# Patient Record
Sex: FEMALE | Race: WHITE | Employment: OTHER | URBAN - METROPOLITAN AREA
[De-identification: names, ages, dates, MRNs, and addresses within clinical notes are randomized per-mention and may not be internally consistent; named-entity substitution may affect disease eponyms.]

---

## 2017-02-06 RX ORDER — AMLODIPINE BESYLATE AND BENAZEPRIL HYDROCHLORIDE 10; 20 MG/1; MG/1
CAPSULE ORAL
Qty: 90 CAPSULE | Refills: 0 | OUTPATIENT
Start: 2017-02-06

## 2017-02-09 ENCOUNTER — OFFICE VISIT (OUTPATIENT)
Dept: PODIATRY CLINIC | Facility: CLINIC | Age: 72
End: 2017-02-09

## 2017-02-09 DIAGNOSIS — B35.1 ONYCHOMYCOSIS: Primary | ICD-10-CM

## 2017-02-09 PROCEDURE — G0463 HOSPITAL OUTPT CLINIC VISIT: HCPCS | Performed by: PODIATRIST

## 2017-02-09 PROCEDURE — 99212 OFFICE O/P EST SF 10 MIN: CPT | Performed by: PODIATRIST

## 2017-02-09 NOTE — PROGRESS NOTES
HPI:    Patient ID: Miguel Hi is a 73year old female. HPI  This 73-year-old female presents 2 months after starting oral Lamisil. Patient is convinced that there is progress noted.   There are no ill effects with taking the oral antifungal medicati ASSESSMENT/PLAN:   Onychomycosis  (primary encounter diagnosis)    No orders of the defined types were placed in this encounter.        Meds This Visit:  No prescriptions requested or ordered in this encounter    Imaging & Referrals:  None       ZQ#[id number]

## 2017-02-17 ENCOUNTER — OFFICE VISIT (OUTPATIENT)
Dept: FAMILY MEDICINE CLINIC | Facility: CLINIC | Age: 72
End: 2017-02-17

## 2017-02-17 VITALS
WEIGHT: 230 LBS | DIASTOLIC BLOOD PRESSURE: 70 MMHG | OXYGEN SATURATION: 98 % | SYSTOLIC BLOOD PRESSURE: 138 MMHG | BODY MASS INDEX: 34.86 KG/M2 | HEIGHT: 68 IN

## 2017-02-17 DIAGNOSIS — C50.911 STAGE 1 BREAST CANCER, ER-, RIGHT (HCC): Primary | ICD-10-CM

## 2017-02-17 DIAGNOSIS — Z12.31 VISIT FOR SCREENING MAMMOGRAM: ICD-10-CM

## 2017-02-17 DIAGNOSIS — Z13.6 SCREENING FOR CARDIOVASCULAR CONDITION: ICD-10-CM

## 2017-02-17 DIAGNOSIS — N95.1 MENOPAUSAL STATE: ICD-10-CM

## 2017-02-17 DIAGNOSIS — Z00.00 ENCOUNTER FOR ANNUAL HEALTH EXAMINATION: ICD-10-CM

## 2017-02-17 DIAGNOSIS — Z17.1 STAGE 1 BREAST CANCER, ER-, RIGHT (HCC): Primary | ICD-10-CM

## 2017-02-17 DIAGNOSIS — Z78.0 POSTMENOPAUSAL: ICD-10-CM

## 2017-02-17 DIAGNOSIS — J45.20 MILD INTERMITTENT ASTHMA WITHOUT COMPLICATION: ICD-10-CM

## 2017-02-17 DIAGNOSIS — I10 ESSENTIAL HYPERTENSION: ICD-10-CM

## 2017-02-17 PROBLEM — J45.909 ASTHMA: Status: ACTIVE | Noted: 2017-02-17

## 2017-02-17 PROBLEM — J45.909 ASTHMA (HCC): Status: ACTIVE | Noted: 2017-02-17

## 2017-02-17 PROCEDURE — G0438 PPPS, INITIAL VISIT: HCPCS | Performed by: FAMILY MEDICINE

## 2017-02-17 RX ORDER — AMLODIPINE BESYLATE AND BENAZEPRIL HYDROCHLORIDE 10; 20 MG/1; MG/1
1 CAPSULE ORAL DAILY
Qty: 90 CAPSULE | Refills: 3 | Status: SHIPPED | OUTPATIENT
Start: 2017-02-17 | End: 2018-02-12

## 2017-02-17 NOTE — PATIENT INSTRUCTIONS
Xavier Silverman's SCREENING SCHEDULE   Tests on this list are recommended by your physician but may not be covered, or covered at this frequency, by your insurer. Please check with your insurance carrier before scheduling to verify coverage.    PREVENTATIVE Screen  Covered every 5 years No results found for this or any previous visit. No flowsheet data found. Fecal Occult Blood   Covered Annually No results found for: FOB, OCCULTSTOOL No flowsheet data found.      Barium Enema-   uncomfortable but covered Moderate/High Risk       No orders found for this or any previous visit.  Medium/high risk factors:   End-stage renal disease   Hemophiliacs who received Factor VIII or IX concentrates   Clients of institutions for the mentally retarded   Persons who live i Blood Sugar (FSB)   Patient must be diagnosed with one of the following:   • Hypertension   • Dyslipidemia   • Obesity (BMI ³30 kg/m2)   • Previous elevated impaired FBS or GTT   … or any two of the following:   • Overweight (BMI ³25 but <30)   • Family hi found. OK to schedule if you are in this risk group, make sure you have a referral   Bone Density Screening      Bone density screening   Covered every 2 yrs after age 72    Covered yearly for Long term Glucocorticoid medication (Steroids) Requires diagnos visit. This may be covered with your prescription benefits, but Medicare does not cover unless Medically needed    Zoster (Not covered by Medicare Part B) No orders found for this or any previous visit.  This may be covered with your pharmacy  prescription

## 2017-02-17 NOTE — PROGRESS NOTES
HPI:   Nola Pat is a 73year old female who presents for a Medicare Subsequent Annual Wellness visit (Pt already had Initial Annual Wellness).     Her last annual assessment has been over 1 year: Annual Physical due on 03/11/1947         Patient Care She  has a past medical history of Asthma; PUD (peptic ulcer disease) (2012); S/P colonoscopy (2009); Port-a-cath in place (2/24/2014); [facility]); and Achilles rupture, right (2016).     She  has past surgical history that includes f Left Eye Visual Acuity: Uncorrected Left Eye Chart Acuity: 20/25   Both Eyes Visual Acuity: Uncorrected Both Eyes Chart Acuity: 20/25   Able To Tolerate Visual Acuity: Yes      General Appearance:  Alert, cooperative, no distress, appears stated age   He Diet assessment: good     Advanced Directive:  Living Will on file in [place]? Carmela Madison does not have a Living Will on file in [place].  Not Discussed      Healthcare Power of  on file in Epic:    Carmela Madison does not have a Power of [employer] activities?: 0-No     Have you had any memory issues?: 0-No    Fall/Risk Scorin    Scoring Interpretation: 0 - 3 No Risk     Depression Screening (PHQ-2/PHQ-9): Over the LAST 2 WEEKS   Little interest or pleasure in doing things (over the last two week flowsheet data found. Pap and Pelvic      Pap: Every 3 yrs age 21-68 or Pap+HPV every 5 yrs age 33-67, age 72 and older at high risk There are no preventive care reminders to display for this patient.  Update Health Maintenance if applicable    Chlamydia

## 2017-02-18 ENCOUNTER — LAB ENCOUNTER (OUTPATIENT)
Dept: LAB | Facility: HOSPITAL | Age: 72
End: 2017-02-18
Attending: FAMILY MEDICINE
Payer: MEDICARE

## 2017-02-18 DIAGNOSIS — I10 ESSENTIAL HYPERTENSION: ICD-10-CM

## 2017-02-18 DIAGNOSIS — Z17.1 STAGE 1 BREAST CANCER, ER-, RIGHT (HCC): ICD-10-CM

## 2017-02-18 DIAGNOSIS — Z13.6 SCREENING FOR CARDIOVASCULAR CONDITION: ICD-10-CM

## 2017-02-18 DIAGNOSIS — C50.911 STAGE 1 BREAST CANCER, ER-, RIGHT (HCC): ICD-10-CM

## 2017-02-18 LAB
ALBUMIN SERPL BCP-MCNC: 4.2 G/DL (ref 3.5–4.8)
ALBUMIN/GLOB SERPL: 1.4 {RATIO} (ref 1–2)
ALP SERPL-CCNC: 75 U/L (ref 32–100)
ALT SERPL-CCNC: 18 U/L (ref 14–54)
ANION GAP SERPL CALC-SCNC: 9 MMOL/L (ref 0–18)
AST SERPL-CCNC: 21 U/L (ref 15–41)
BASOPHILS # BLD: 0.1 K/UL (ref 0–0.2)
BASOPHILS NFR BLD: 1 %
BILIRUB SERPL-MCNC: 1 MG/DL (ref 0.3–1.2)
BUN SERPL-MCNC: 14 MG/DL (ref 8–20)
BUN/CREAT SERPL: 18.7 (ref 10–20)
CALCIUM SERPL-MCNC: 9.6 MG/DL (ref 8.5–10.5)
CHLORIDE SERPL-SCNC: 101 MMOL/L (ref 95–110)
CHOLEST SERPL-MCNC: 228 MG/DL (ref 110–200)
CO2 SERPL-SCNC: 28 MMOL/L (ref 22–32)
CREAT SERPL-MCNC: 0.75 MG/DL (ref 0.5–1.5)
EOSINOPHIL # BLD: 0.1 K/UL (ref 0–0.7)
EOSINOPHIL NFR BLD: 2 %
ERYTHROCYTE [DISTWIDTH] IN BLOOD BY AUTOMATED COUNT: 12.5 % (ref 11–15)
GLOBULIN PLAS-MCNC: 2.9 G/DL (ref 2.5–3.7)
GLUCOSE SERPL-MCNC: 108 MG/DL (ref 70–99)
HCT VFR BLD AUTO: 39.3 % (ref 35–48)
HDLC SERPL-MCNC: 67 MG/DL
HGB BLD-MCNC: 13.2 G/DL (ref 12–16)
LDLC SERPL CALC-MCNC: 142 MG/DL (ref 0–99)
LYMPHOCYTES # BLD: 1.9 K/UL (ref 1–4)
LYMPHOCYTES NFR BLD: 34 %
MCH RBC QN AUTO: 31.5 PG (ref 27–32)
MCHC RBC AUTO-ENTMCNC: 33.6 G/DL (ref 32–37)
MCV RBC AUTO: 93.6 FL (ref 80–100)
MONOCYTES # BLD: 0.7 K/UL (ref 0–1)
MONOCYTES NFR BLD: 12 %
NEUTROPHILS # BLD AUTO: 3 K/UL (ref 1.8–7.7)
NEUTROPHILS NFR BLD: 52 %
NONHDLC SERPL-MCNC: 161 MG/DL
OSMOLALITY UR CALC.SUM OF ELEC: 287 MOSM/KG (ref 275–295)
PLATELET # BLD AUTO: 289 K/UL (ref 140–400)
PMV BLD AUTO: 7.4 FL (ref 7.4–10.3)
POTASSIUM SERPL-SCNC: 4.1 MMOL/L (ref 3.3–5.1)
PROT SERPL-MCNC: 7.1 G/DL (ref 5.9–8.4)
RBC # BLD AUTO: 4.2 M/UL (ref 3.7–5.4)
SODIUM SERPL-SCNC: 138 MMOL/L (ref 136–144)
TRIGL SERPL-MCNC: 93 MG/DL (ref 1–149)
WBC # BLD AUTO: 5.7 K/UL (ref 4–11)

## 2017-02-18 PROCEDURE — 85025 COMPLETE CBC W/AUTO DIFF WBC: CPT

## 2017-02-18 PROCEDURE — 80053 COMPREHEN METABOLIC PANEL: CPT

## 2017-02-18 PROCEDURE — 36415 COLL VENOUS BLD VENIPUNCTURE: CPT

## 2017-02-18 PROCEDURE — 80061 LIPID PANEL: CPT

## 2017-02-24 ENCOUNTER — HOSPITAL ENCOUNTER (OUTPATIENT)
Dept: BONE DENSITY | Facility: HOSPITAL | Age: 72
Discharge: HOME | End: 2017-02-24
Attending: FAMILY MEDICINE
Payer: MEDICARE

## 2017-02-24 DIAGNOSIS — N95.1 MENOPAUSAL STATE: ICD-10-CM

## 2017-02-24 DIAGNOSIS — Z78.0 POSTMENOPAUSAL: ICD-10-CM

## 2017-02-24 PROCEDURE — 77080 DXA BONE DENSITY AXIAL: CPT

## 2017-04-13 ENCOUNTER — OFFICE VISIT (OUTPATIENT)
Dept: HEMATOLOGY/ONCOLOGY | Facility: HOSPITAL | Age: 72
End: 2017-04-13
Attending: INTERNAL MEDICINE
Payer: MEDICARE

## 2017-04-13 VITALS
HEART RATE: 80 BPM | DIASTOLIC BLOOD PRESSURE: 74 MMHG | RESPIRATION RATE: 18 BRPM | TEMPERATURE: 98 F | SYSTOLIC BLOOD PRESSURE: 129 MMHG | HEIGHT: 68 IN | WEIGHT: 230 LBS | BODY MASS INDEX: 34.86 KG/M2

## 2017-04-13 DIAGNOSIS — Z17.1 STAGE 1 BREAST CANCER, ER-, RIGHT (HCC): Primary | ICD-10-CM

## 2017-04-13 DIAGNOSIS — C50.911 STAGE 1 BREAST CANCER, ER-, RIGHT (HCC): Primary | ICD-10-CM

## 2017-04-13 DIAGNOSIS — Z80.9 FAMILY HISTORY OF CANCER: ICD-10-CM

## 2017-04-13 PROCEDURE — 99213 OFFICE O/P EST LOW 20 MIN: CPT | Performed by: INTERNAL MEDICINE

## 2017-04-13 PROCEDURE — G0463 HOSPITAL OUTPT CLINIC VISIT: HCPCS | Performed by: INTERNAL MEDICINE

## 2017-04-13 NOTE — PROGRESS NOTES
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 She returns today for follow-up. She is not aware of bone pain, chest wall recurrence, or shortness of breath.        Patient sustained a r Allergic/Immunologic: Negative for environmental allergies. Neurological: Negative for light-headedness. Tingling in arm if she sleeps on it. Hematological: Negative for adenopathy. Does not bruise/bleed easily.    Psychiatric/Behavioral: Positiv Past Surgical History    FINE NEEDLE ASPIRATION; W/ IMAGING GUIDANCE Right 11/19/2013    Comment ULTRASOUND GUIDED FNA AND CORE BIOPSY RIGHT BREAST:    MASTECTOMY LEFT Left 12/17/2013    Comment RIGHT BREAST MASTECTOMY WITH SENTINEL LYMPH NODE BIOPSY AND L Abdominal: Soft. Bowel sounds are normal. She exhibits no distension and no mass. There is no tenderness. Musculoskeletal: Normal range of motion. She exhibits no edema or tenderness. Lymphadenopathy:     She has no cervical adenopathy.    Neurological:             BMD:    0.936 gm/sq. cm.           T MXQJT:         6.2        G SCORE:         1.5      PA LUMBAR SPINE (L1 - L4)              BMD:    0.999 gm/sq.  cm.           T LUPGB:         -8.7       M SCORE:         1.7                      A scores ar

## 2017-05-17 ENCOUNTER — OFFICE VISIT (OUTPATIENT)
Dept: FAMILY MEDICINE CLINIC | Facility: CLINIC | Age: 72
End: 2017-05-17

## 2017-05-17 VITALS
WEIGHT: 235 LBS | SYSTOLIC BLOOD PRESSURE: 126 MMHG | OXYGEN SATURATION: 98 % | DIASTOLIC BLOOD PRESSURE: 70 MMHG | BODY MASS INDEX: 36.88 KG/M2 | HEART RATE: 97 BPM | HEIGHT: 67 IN

## 2017-05-17 DIAGNOSIS — S83.521A SPRAIN OF POSTERIOR CRUCIATE LIGAMENT OF RIGHT KNEE, INITIAL ENCOUNTER: Primary | ICD-10-CM

## 2017-05-17 PROCEDURE — 99212 OFFICE O/P EST SF 10 MIN: CPT | Performed by: FAMILY MEDICINE

## 2017-05-17 RX ORDER — METHYLPREDNISOLONE 4 MG/1
TABLET ORAL
Qty: 1 KIT | Refills: 1 | Status: SHIPPED | OUTPATIENT
Start: 2017-05-17 | End: 2017-10-12 | Stop reason: ALTCHOICE

## 2017-05-17 NOTE — PROGRESS NOTES
[facility] Office Note  Chief Complaint:   Patient presents with:  Knee Pain: behind her knee R      HPI:   This is a 81year old female coming in for      Results for orders placed or performed in visit on 02/18/17  -L RIGHT BREAST MASTECTOMY WITH SENTINEL LYMPH NODE BIOPSY AND LEFT BREAST MASTECTOMY   • Achilles rupture, right 2016         Past Surgical History    FINE NEEDLE ASPIRATION; W/ IMAGING GUIDANCE Right 11/19/2013    Comment ULTRASOUND GUIDED FNA AND CORE BIO Oral Cap Take 1 capsule by mouth daily. Disp:  Rfl:    Budesonide-Formoterol Fumarate (SYMBICORT) 160-4.5 MCG/ACT Inhalation Aerosol Inhale 2 puffs into the lungs daily. Disp:  Rfl:    Omega-3 Fatty Acids (KP FISH OIL) 1200 MG Oral Cap Take  by mouth.  Disp keith  medrol    Meds & Refills for this Visit:  Signed Prescriptions Disp Refills    methylPREDNISolone (MEDROL) 4 MG Oral Tablet Therapy Pack 1 kit 1      Sig: As directed.            Health Maintenance:  Hedy bailon on 03/11/1987  FIT Colorectal

## 2017-05-23 ENCOUNTER — TELEPHONE (OUTPATIENT)
Dept: PODIATRY CLINIC | Facility: CLINIC | Age: 72
End: 2017-05-23

## 2017-05-23 NOTE — TELEPHONE ENCOUNTER
S/w pt and she states her toenail fungus is still a problem and wants to know if SCR will rx the lamisil again.  I advised her that SCR would want her to f/u and offered her an appt, but she declined and states nothing has changed and wants to know if SCR w

## 2017-05-23 NOTE — TELEPHONE ENCOUNTER
If lamisil did not clear the problem once, i would not use it again. If the oral med did not work there are no other options of care.  scr

## 2017-05-23 NOTE — TELEPHONE ENCOUNTER
S/w pt and explained that no further tx would help the toenail fungus since the lamisil did not work. Pt verbalized understanding.

## 2017-05-23 NOTE — TELEPHONE ENCOUNTER
Pt requesting appt on week of 6/5, states she was supposed to see  SCR after finishing her rx. Pls advise thank you.

## 2017-08-18 ENCOUNTER — TELEPHONE (OUTPATIENT)
Dept: FAMILY MEDICINE CLINIC | Facility: CLINIC | Age: 72
End: 2017-08-18

## 2017-08-18 DIAGNOSIS — M25.569 ACUTE KNEE PAIN, UNSPECIFIED LATERALITY: Primary | ICD-10-CM

## 2017-09-05 RX ORDER — BUDESONIDE AND FORMOTEROL FUMARATE DIHYDRATE 160; 4.5 UG/1; UG/1
AEROSOL RESPIRATORY (INHALATION)
Qty: 1 INHALER | Refills: 2 | Status: SHIPPED | OUTPATIENT
Start: 2017-09-05 | End: 2018-02-12

## 2017-10-12 ENCOUNTER — OFFICE VISIT (OUTPATIENT)
Dept: HEMATOLOGY/ONCOLOGY | Facility: HOSPITAL | Age: 72
End: 2017-10-12
Attending: INTERNAL MEDICINE
Payer: MEDICARE

## 2017-10-12 VITALS
RESPIRATION RATE: 16 BRPM | HEIGHT: 68 IN | TEMPERATURE: 99 F | BODY MASS INDEX: 34.86 KG/M2 | SYSTOLIC BLOOD PRESSURE: 126 MMHG | DIASTOLIC BLOOD PRESSURE: 67 MMHG | WEIGHT: 230 LBS | HEART RATE: 76 BPM

## 2017-10-12 DIAGNOSIS — Z80.9 FAMILY HISTORY OF CANCER: ICD-10-CM

## 2017-10-12 DIAGNOSIS — C50.911 MALIGNANT NEOPLASM OF RIGHT BREAST, STAGE 1, ESTROGEN RECEPTOR NEGATIVE (HCC): Primary | ICD-10-CM

## 2017-10-12 DIAGNOSIS — Z17.1 MALIGNANT NEOPLASM OF RIGHT BREAST, STAGE 1, ESTROGEN RECEPTOR NEGATIVE (HCC): Primary | ICD-10-CM

## 2017-10-12 PROCEDURE — G0463 HOSPITAL OUTPT CLINIC VISIT: HCPCS | Performed by: INTERNAL MEDICINE

## 2017-10-12 PROCEDURE — 99213 OFFICE O/P EST LOW 20 MIN: CPT | Performed by: INTERNAL MEDICINE

## 2017-10-12 NOTE — PROGRESS NOTES
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 She returns today for follow-up. She is not aware of bone pain, chest wall recurrence, or shortness of breath.     She is tearful with her Allergic/Immunologic: Negative for environmental allergies. Neurological: Negative for light-headedness. Tingling in arm if she sleeps on it. Hematological: Negative for adenopathy. Does not bruise/bleed easily.    Psychiatric/Behavioral: Positiv Past Surgical History:  11/19/2013: FINE NEEDLE ASPIRATION; W/ IMAGING GUIDANCE Right      Comment: ULTRASOUND GUIDED FNA AND CORE BIOPSY RIGHT                BREAST:  12/17/2013: MASTECTOMY LEFT Left      Comment: RIGHT BREAST MASTECTOMY WITH SENTINEL LYM Cardiovascular: Normal rate, regular rhythm and normal heart sounds. Pulmonary/Chest: Effort normal and breath sounds normal. No respiratory distress. She has no wheezes. She exhibits no tenderness. S/p port a cath right chest    Abdominal: Soft. LEFT FEMORAL NECK              BMD:    0.808 gm/sq. cm.           T NCAWH:         -2.4       V SCORE:         1.4      LEFT TOTAL HIP              BMD:    0.936 gm/sq.  cm.           T CBJH.1        V SCORE:         1.5      PA LUMBAR SPINE (L1 95 - 110 mmol/L 101   Carbon Dioxide, Total      22 - 32 mmol/L 28   BUN      8 - 20 mg/dL 14   CREATININE      0.50 - 1.50 mg/dL 0.75   CALCIUM      8.5 - 10.5 mg/dL 9.6   ALT (SGPT)      14 - 54 U/L 18   AST (SGOT)      15 - 41 U/L 21   ALKALINE PHOS

## 2018-02-12 ENCOUNTER — OFFICE VISIT (OUTPATIENT)
Dept: FAMILY MEDICINE CLINIC | Facility: CLINIC | Age: 73
End: 2018-02-12

## 2018-02-12 VITALS
DIASTOLIC BLOOD PRESSURE: 82 MMHG | BODY MASS INDEX: 34.1 KG/M2 | HEIGHT: 68 IN | WEIGHT: 225 LBS | SYSTOLIC BLOOD PRESSURE: 120 MMHG

## 2018-02-12 DIAGNOSIS — E78.00 HIGH CHOLESTEROL: Primary | ICD-10-CM

## 2018-02-12 DIAGNOSIS — I10 ESSENTIAL HYPERTENSION: ICD-10-CM

## 2018-02-12 PROCEDURE — 99213 OFFICE O/P EST LOW 20 MIN: CPT | Performed by: FAMILY MEDICINE

## 2018-02-12 RX ORDER — ALBUTEROL SULFATE 90 UG/1
2 AEROSOL, METERED RESPIRATORY (INHALATION) EVERY 6 HOURS PRN
Qty: 1 INHALER | Refills: 5 | Status: SHIPPED | OUTPATIENT
Start: 2018-02-12 | End: 2020-05-13

## 2018-02-12 RX ORDER — BUDESONIDE AND FORMOTEROL FUMARATE DIHYDRATE 160; 4.5 UG/1; UG/1
AEROSOL RESPIRATORY (INHALATION)
Qty: 1 INHALER | Refills: 5 | Status: SHIPPED | OUTPATIENT
Start: 2018-02-12 | End: 2019-09-30

## 2018-02-12 RX ORDER — AMLODIPINE BESYLATE AND BENAZEPRIL HYDROCHLORIDE 10; 20 MG/1; MG/1
1 CAPSULE ORAL DAILY
Qty: 90 CAPSULE | Refills: 3 | Status: SHIPPED | OUTPATIENT
Start: 2018-02-12 | End: 2018-10-11

## 2018-02-12 RX ORDER — AMLODIPINE BESYLATE AND BENAZEPRIL HYDROCHLORIDE 10; 20 MG/1; MG/1
1 CAPSULE ORAL DAILY
Qty: 90 CAPSULE | Refills: 3 | Status: CANCELLED | OUTPATIENT
Start: 2018-02-12

## 2018-02-12 NOTE — PROGRESS NOTES
[facility] Office Note  Chief Complaint:   Patient presents with:  HTN  Lump: on top of head, no growth or pain  Rash: on inside of arms  Asthma  Neck Pain      HPI:   This is a 81year old female coming in for f\u    R cancer [facility]) 12/2/2013 12/17/2013:  RIGHT BREAST MASTECTOMY WITH SENTINEL LYMPH NODE BIOPSY AND LEFT BREAST MASTECTOMY   • Port-a-cath in place 2/24/2014   • PUD (peptic ulcer disease) 2012    /esophagitis / EGD with biopsy   • S/P colonoscopy 2009 0.3 MG/0.3ML Injection Solution Auto-injector Inject IM in event of  allergic reaction Disp: 1 each Rfl: 0   Acidophilus/Pectin (PROBIOTIC) Oral Cap Take 1 capsule by mouth daily.  Disp:  Rfl:    Omega-3 Fatty Acids (KP FISH OIL) 1200 MG Oral Cap Take  by m Visit:  Pending Prescriptions Disp Refills    Albuterol Sulfate HFA (PROAIR HFA) 108 (90 Base) MCG/ACT Inhalation Aero Soln 1 Inhaler 5     Sig: Inhale 2 puffs into the lungs every 6 (six) hours as needed for Wheezing.       Budesonide-Formoterol Fumarate (

## 2018-04-12 ENCOUNTER — OFFICE VISIT (OUTPATIENT)
Dept: HEMATOLOGY/ONCOLOGY | Facility: HOSPITAL | Age: 73
End: 2018-04-12
Attending: INTERNAL MEDICINE
Payer: MEDICARE

## 2018-04-12 VITALS
RESPIRATION RATE: 16 BRPM | HEIGHT: 68 IN | HEART RATE: 96 BPM | DIASTOLIC BLOOD PRESSURE: 81 MMHG | SYSTOLIC BLOOD PRESSURE: 138 MMHG | BODY MASS INDEX: 33.95 KG/M2 | WEIGHT: 224 LBS | TEMPERATURE: 98 F

## 2018-04-12 DIAGNOSIS — Z80.9 FAMILY HISTORY OF CANCER: ICD-10-CM

## 2018-04-12 DIAGNOSIS — C50.911 MALIGNANT NEOPLASM OF RIGHT BREAST, STAGE 1, ESTROGEN RECEPTOR NEGATIVE (HCC): Primary | ICD-10-CM

## 2018-04-12 DIAGNOSIS — Z17.1 MALIGNANT NEOPLASM OF RIGHT BREAST, STAGE 1, ESTROGEN RECEPTOR NEGATIVE (HCC): Primary | ICD-10-CM

## 2018-04-12 DIAGNOSIS — J45.20 MILD INTERMITTENT ASTHMA WITHOUT COMPLICATION: ICD-10-CM

## 2018-04-12 DIAGNOSIS — E78.2 MIXED HYPERLIPIDEMIA: ICD-10-CM

## 2018-04-12 DIAGNOSIS — I10 ESSENTIAL HYPERTENSION: ICD-10-CM

## 2018-04-12 PROCEDURE — 99214 OFFICE O/P EST MOD 30 MIN: CPT | Performed by: INTERNAL MEDICINE

## 2018-04-13 ENCOUNTER — LAB ENCOUNTER (OUTPATIENT)
Dept: LAB | Facility: HOSPITAL | Age: 73
End: 2018-04-13
Attending: INTERNAL MEDICINE
Payer: MEDICARE

## 2018-04-13 DIAGNOSIS — Z17.1 MALIGNANT NEOPLASM OF RIGHT BREAST, STAGE 1, ESTROGEN RECEPTOR NEGATIVE (HCC): ICD-10-CM

## 2018-04-13 DIAGNOSIS — I10 ESSENTIAL HYPERTENSION: ICD-10-CM

## 2018-04-13 DIAGNOSIS — E78.2 MIXED HYPERLIPIDEMIA: ICD-10-CM

## 2018-04-13 DIAGNOSIS — C50.911 MALIGNANT NEOPLASM OF RIGHT BREAST, STAGE 1, ESTROGEN RECEPTOR NEGATIVE (HCC): ICD-10-CM

## 2018-04-13 DIAGNOSIS — J45.20 MILD INTERMITTENT ASTHMA WITHOUT COMPLICATION: ICD-10-CM

## 2018-04-13 DIAGNOSIS — Z80.9 FAMILY HISTORY OF CANCER: ICD-10-CM

## 2018-04-13 PROCEDURE — 80053 COMPREHEN METABOLIC PANEL: CPT

## 2018-04-13 PROCEDURE — 85025 COMPLETE CBC W/AUTO DIFF WBC: CPT

## 2018-04-13 PROCEDURE — 36415 COLL VENOUS BLD VENIPUNCTURE: CPT

## 2018-04-13 PROCEDURE — 80061 LIPID PANEL: CPT

## 2018-04-13 NOTE — PROGRESS NOTES
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 She returns today for follow-up. She is not aware of bone pain, chest wall recurrence, or shortness of breath.     She has been following a Musculoskeletal: Negative for arthralgias, back pain, joint swelling and neck stiffness. Low back disc comfort is minimal and present when not standing straight   Skin: Negative for rash. Allergic/Immunologic: Negative for environmental allergies. • Port-a-cath in place 2/24/2014   • PUD (peptic ulcer disease) 2012    /esophagitis / EGD with biopsy   • S/P colonoscopy 2009     Past Surgical History:  11/19/2013: FINE NEEDLE ASPIRATION; W/ IMAGING GUIDANCE Right      Comment: ULTRASOUND GUIDED FNA AN Eyes: Conjunctivae and EOM are normal. Pupils are equal, round, and reactive to light. No scleral icterus. Neck: Normal range of motion. Neck supple. Cardiovascular: Normal rate, regular rhythm and normal heart sounds.     Pulmonary/Chest: Effort normal     COMPARISON: None.      INDICATIONS:   Postmenopausal female osteoporosis assessment.  History of breast cancer 2013      TECHNIQUE:     Measurement of bone mineral density of the lumbar spine and hip was performed on a Hologic dual energy x-ray absorpti 1. Measured values are normal for age and not associated with an elevated fracture risk for this patient at this time. 2. 10 year Fracture Risk: Major osteoporotic fracture 6.2%.  Hip fracture 0.4%         Component      Latest Ref Rng & Units 2/18/2017 0 - 99 mg/dL 142 (H)

## 2018-04-28 ENCOUNTER — HOSPITAL ENCOUNTER (INPATIENT)
Facility: HOSPITAL | Age: 73
LOS: 1 days | Discharge: HOME | DRG: 641 | End: 2018-04-29
Attending: EMERGENCY MEDICINE | Admitting: HOSPITALIST
Payer: MEDICARE

## 2018-04-28 ENCOUNTER — APPOINTMENT (OUTPATIENT)
Dept: CT IMAGING | Facility: HOSPITAL | Age: 73
DRG: 641 | End: 2018-04-28
Attending: EMERGENCY MEDICINE
Payer: MEDICARE

## 2018-04-28 ENCOUNTER — APPOINTMENT (OUTPATIENT)
Dept: MRI IMAGING | Facility: HOSPITAL | Age: 73
DRG: 641 | End: 2018-04-28
Attending: HOSPITALIST
Payer: MEDICARE

## 2018-04-28 DIAGNOSIS — G44.209 TENSION HEADACHE: ICD-10-CM

## 2018-04-28 DIAGNOSIS — E87.1 HYPONATREMIA: Primary | ICD-10-CM

## 2018-04-28 DIAGNOSIS — M54.2 NECK PAIN: ICD-10-CM

## 2018-04-28 PROCEDURE — 99223 1ST HOSP IP/OBS HIGH 75: CPT | Performed by: HOSPITALIST

## 2018-04-28 PROCEDURE — 72141 MRI NECK SPINE W/O DYE: CPT | Performed by: HOSPITALIST

## 2018-04-28 PROCEDURE — 3E0T3BZ INTRODUCTION OF ANESTHETIC AGENT INTO PERIPHERAL NERVES AND PLEXI, PERCUTANEOUS APPROACH: ICD-10-PCS | Performed by: EMERGENCY MEDICINE

## 2018-04-28 PROCEDURE — 70450 CT HEAD/BRAIN W/O DYE: CPT | Performed by: EMERGENCY MEDICINE

## 2018-04-28 PROCEDURE — 99223 1ST HOSP IP/OBS HIGH 75: CPT | Performed by: INTERNAL MEDICINE

## 2018-04-28 PROCEDURE — 70553 MRI BRAIN STEM W/O & W/DYE: CPT | Performed by: HOSPITALIST

## 2018-04-28 RX ORDER — ONDANSETRON 2 MG/ML
4 INJECTION INTRAMUSCULAR; INTRAVENOUS EVERY 4 HOURS PRN
Status: DISCONTINUED | OUTPATIENT
Start: 2018-04-28 | End: 2018-04-29

## 2018-04-28 RX ORDER — DIPHENOXYLATE HYDROCHLORIDE AND ATROPINE SULFATE 2.5; .025 MG/1; MG/1
1 TABLET ORAL DAILY
Status: DISCONTINUED | OUTPATIENT
Start: 2018-04-28 | End: 2018-04-29

## 2018-04-28 RX ORDER — NITROGLYCERIN 0.4 MG/1
0.4 TABLET SUBLINGUAL EVERY 5 MIN PRN
Status: DISCONTINUED | OUTPATIENT
Start: 2018-04-28 | End: 2018-04-29

## 2018-04-28 RX ORDER — SODIUM CHLORIDE 0.9 % (FLUSH) 0.9 %
3 SYRINGE (ML) INJECTION AS NEEDED
Status: DISCONTINUED | OUTPATIENT
Start: 2018-04-28 | End: 2018-04-29

## 2018-04-28 RX ORDER — ALBUTEROL SULFATE 90 UG/1
2 AEROSOL, METERED RESPIRATORY (INHALATION) EVERY 6 HOURS PRN
Status: DISCONTINUED | OUTPATIENT
Start: 2018-04-28 | End: 2018-04-29

## 2018-04-28 RX ORDER — HYDROCODONE BITARTRATE AND ACETAMINOPHEN 5; 325 MG/1; MG/1
1 TABLET ORAL EVERY 6 HOURS PRN
Status: DISCONTINUED | OUTPATIENT
Start: 2018-04-28 | End: 2018-04-29

## 2018-04-28 RX ORDER — BUPIVACAINE HYDROCHLORIDE 2.5 MG/ML
20 INJECTION, SOLUTION EPIDURAL; INFILTRATION; INTRACAUDAL ONCE
Status: COMPLETED | OUTPATIENT
Start: 2018-04-28 | End: 2018-04-28

## 2018-04-28 RX ORDER — PANTOPRAZOLE SODIUM 40 MG/1
40 TABLET, DELAYED RELEASE ORAL
Status: DISCONTINUED | OUTPATIENT
Start: 2018-04-28 | End: 2018-04-29

## 2018-04-28 RX ORDER — LACTOBACILLUS ACIDOPH-L.BULGARICUS 1 MILLION CELL CHEWABLE TABLET 1MM CELL
1 TABLET,CHEWABLE ORAL DAILY
Status: DISCONTINUED | OUTPATIENT
Start: 2018-04-28 | End: 2018-04-29

## 2018-04-28 RX ORDER — METOCLOPRAMIDE HYDROCHLORIDE 5 MG/ML
10 INJECTION INTRAMUSCULAR; INTRAVENOUS ONCE
Status: COMPLETED | OUTPATIENT
Start: 2018-04-28 | End: 2018-04-28

## 2018-04-28 RX ORDER — MAGNESIUM OXIDE 400 MG (241.3 MG MAGNESIUM) TABLET
400 TABLET ONCE
Status: COMPLETED | OUTPATIENT
Start: 2018-04-28 | End: 2018-04-28

## 2018-04-28 RX ORDER — AMLODIPINE BESYLATE AND BENAZEPRIL HYDROCHLORIDE 10; 20 MG/1; MG/1
1 CAPSULE ORAL NIGHTLY
Status: DISCONTINUED | OUTPATIENT
Start: 2018-04-28 | End: 2018-04-28 | Stop reason: RX

## 2018-04-28 RX ORDER — LISINOPRIL 20 MG/1
20 TABLET ORAL NIGHTLY
Status: DISCONTINUED | OUTPATIENT
Start: 2018-04-28 | End: 2018-04-29

## 2018-04-28 RX ORDER — AMLODIPINE BESYLATE 10 MG/1
10 TABLET ORAL NIGHTLY
Status: DISCONTINUED | OUTPATIENT
Start: 2018-04-28 | End: 2018-04-29

## 2018-04-28 RX ORDER — SODIUM CHLORIDE 9 MG/ML
INJECTION, SOLUTION INTRAVENOUS CONTINUOUS
Status: DISCONTINUED | OUTPATIENT
Start: 2018-04-28 | End: 2018-04-29

## 2018-04-28 RX ORDER — HEPARIN SODIUM 5000 [USP'U]/ML
5000 INJECTION, SOLUTION INTRAVENOUS; SUBCUTANEOUS EVERY 12 HOURS SCHEDULED
Status: DISCONTINUED | OUTPATIENT
Start: 2018-04-28 | End: 2018-04-29

## 2018-04-28 NOTE — PROGRESS NOTES
ADMISSION NOTE    72year old female with h/o breast ca s/p bilateral mastectomies and chemo presents with intractable headache and upper neck pain. No photophobia no neck stiffness in fact flexing the neck makes the discomfort better.   She was also found

## 2018-04-28 NOTE — ED PROVIDER NOTES
Patient Seen in: [facility] AND [facility] Emergency Department    History   Patient presents with:  Headache    Stated Complaint: hedache X 1 month    HPI    42-year-old female with history of breast cancer, currently in remission, hyperlipidemia, peptic ulcer Comment: weekly      Review of Systems   Constitutional: Negative for appetite change, fatigue and fever. HENT: Negative for congestion, ear pain and sore throat. Eyes: Negative for pain, discharge and redness.    Respiratory: Negative for cough, short Musculoskeletal: Normal range of motion. She exhibits no tenderness. Neurological: She is alert and oriented to person, place, and time.    5/5 strength in b/l UEs and LEs, normal sensation in all extremities, normal finger to nose b/l, normal gait, no Labs:    Recent Results (from the past 24 hour(s))  -BASIC METABOLIC PANEL (8)   Collection Time: 04/28/18  1:43 AM   Result Value Ref Range   Glucose 127 (H) 70 - 99 mg/dL   Sodium 123 (L) 136 - 144 mmol/L   Potassium 3.6 3.3 - 5.1 mmol/L   Chloride 92 vitals  - afebrile, hemodynamically stable  - I ordered and personally reviewed the labs and imaging and found CT head unremarkable, new hyponatremia/hypochloremia, no anemia, no leukocytosis  - fluids, reglan, heating pad ordered  - occipital nerve block Prescribed:  Current Discharge Medication List        Present on Admission  Date Reviewed: 4/12/2018          ICD-10-CM Noted POA    Hyponatremia E87.1 4/28/2018 Unknown

## 2018-04-28 NOTE — ED NOTES
Pt to ED for posterior headache with radiation to bilateral sides of neck for x1 months worsening today with some intermittent nausea without vomiting. Patient states she is unable to find a comfortable position and finds no relief from motrin/tylenol.  She

## 2018-04-28 NOTE — PROGRESS NOTES
Pharmacy Note: Dietary Supplement Discontinuation Per Policy    Duraflex has been discontinued on [place] per policy. This supplement may be restarted upon discharge using the medication reconciliation process.     Thank you,   Diana Sherman, PharmD

## 2018-04-28 NOTE — PROGRESS NOTES
See H and P from Dr Pinot Cons. Reviewed MRI brain and cervical spine. Negative for metastatic disease. Sodium improved this am.   Dr Mary Manriquez consulted for hyponatremia.      Nasir Thornton  4/28/2018

## 2018-04-28 NOTE — H&P
[facility]    PATIENT'S NAME: Esteban Andrew   ATTENDING PHYSICIAN: Bradley Monday, DO   PATIENT ACCOUNT#:   [de-identified]    LOCATION:  [address] [place] [address] #:   [id number]       YOB: 1945  ADMISSION DATE:       04/28/2018 migraine headaches, they responded to Tylenol. She does not have any photophobia. She has had no fever. She has had no recent URI.   The discomfort in her head and neck actually gets better when she flexes her neck with her chin on her chest.  She denies budesonide/formoterol fumarate 1 puff twice a day. The patient has an EpiPen. Multivitamin once a day, omega-3 fatty acids 1200 mg daily. ALLERGIES:  Pistachios cause respiratory failure. Cat and dog dander cause difficulty breathing.   Penicillins c sounds. No guarding. No rebound. EXTREMITIES:  No clubbing, cyanosis, calf tenderness, or palpable cords. SKIN:  Warm and dry, without any significant rashes. NEUROLOGIC:  The patient was awake, alert, oriented x3.   There was no focal neurologic defic plan were discussed with her. All of her questions were answered, and she agreed with the plan of care as outlined above.      Dictated By Salina Jones MD  d: 04/28/2018 08:28:53  t: 04/28/2018 09:23:36  [facility] [id number]  [facility]/

## 2018-04-28 NOTE — PROGRESS NOTES
Therapeutic Substitution Note    Lotrel (amlodipine 10mg/benazepril 20mg) is non-formulary and was auto-substituted to Amlodipine 10mg/Lisinopril 20mg per [employer]change Policy.     Sonu Rome, 04/28/18, 9:15 AM

## 2018-04-29 ENCOUNTER — APPOINTMENT (OUTPATIENT)
Dept: GENERAL RADIOLOGY | Facility: HOSPITAL | Age: 73
DRG: 641 | End: 2018-04-29
Attending: INTERNAL MEDICINE
Payer: MEDICARE

## 2018-04-29 VITALS
BODY MASS INDEX: 34.23 KG/M2 | WEIGHT: 225.88 LBS | HEIGHT: 68 IN | SYSTOLIC BLOOD PRESSURE: 123 MMHG | TEMPERATURE: 98 F | RESPIRATION RATE: 18 BRPM | DIASTOLIC BLOOD PRESSURE: 65 MMHG | OXYGEN SATURATION: 97 % | HEART RATE: 69 BPM

## 2018-04-29 PROCEDURE — 71046 X-RAY EXAM CHEST 2 VIEWS: CPT | Performed by: INTERNAL MEDICINE

## 2018-04-29 PROCEDURE — 99239 HOSP IP/OBS DSCHRG MGMT >30: CPT | Performed by: HOSPITALIST

## 2018-04-29 RX ORDER — ASPIRIN 81 MG/1
81 TABLET, CHEWABLE ORAL DAILY
Refills: 0 | Status: SHIPPED | COMMUNITY
Start: 2018-04-30

## 2018-04-29 RX ORDER — ASPIRIN 81 MG/1
81 TABLET, CHEWABLE ORAL DAILY
Status: DISCONTINUED | OUTPATIENT
Start: 2018-04-29 | End: 2018-04-29

## 2018-04-29 NOTE — DISCHARGE SUMMARY
[facility]    Discharge Summary    Miguel Hi Patient Status:  Inpatient    3/11/1945 MRN [id number]   Location [facility] 3W/SW Attending Pham Eugene MD   Hosp Day # 1 PCP Clif Busby MD     Date of Admission: / not on a prescribed diuretic. She states that the headache became severe on the day of admission. She could not find a comfortable position to lie in. She tried taking Tylenol, which did not help.   Typically, when she gets migraine headaches, they respo History of peptic ulcer disease, gastritis. Continue proton pump inhibitor. 6.       DVT prophylaxis. Subcutaneous heparin.     IMAGING STUDIES: SOME MAY NEED FOLLOW UP WITH PCP   Xr Chest Pa + Lat Chest (cpt=[id number])    Result Date: 4/29/2018  CONCLU Melody Avila MD on 4/28/2018 at 13:18     Approved by (CST): Melody Avila MD on 4/28/2018 at 13:23            Discharge Medications:      Discharge Medications      START taking these medications      Instructions Prescription details   aspirin 81 MG Discharge Instructions: None  >30 MIN SPENT ON THIS DC   IGOR GHOSH  4/29/2018  75:19 AM    **Certification      PHYSICIAN Certification of Need for Inpatient Hospitalization - Initial Certification    Patient will require inpatient services that mckenzie

## 2018-04-29 NOTE — PLAN OF CARE
METABOLIC/FLUID AND ELECTROLYTES - ADULT    • Electrolytes maintained within normal limits Adequate for Discharge    • Hemodynamic stability and optimal renal function maintained Adequate for Discharge        Patient/Family Goals    • Patient/Family Long T

## 2018-04-29 NOTE — CONSULTS
[facility]    PATIENT'S NAME: Dariela Villatoro   ATTENDING PHYSICIAN: Ariane Guzman DO   CONSULTING PHYSICIAN: Felipa Oliver.  Jesus Tucker MD   PATIENT ACCOUNT#:   [id number]    LOCATION:  [address] [place] [address] #:   [id number]       DATE OF BIRTH: amlodipine and benazepril, which is called Lotrel 10/20 daily. SOCIAL HISTORY:  She had 3 children, but only 2 are alive. She is . She does not smoke or drink. She is retired.     REVIEW OF SYSTEMS:  She is somewhat weak but feels a little bett will defer to Dr. Alyssa Ken for this. 2.   Hyponatremia. Her urine electrolytes were equivocal.  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 I feel she may be drinking too much water and not getting enough salt in.   I asked her to increase pro

## 2018-04-30 ENCOUNTER — TELEPHONE (OUTPATIENT)
Dept: NEPHROLOGY | Facility: CLINIC | Age: 73
End: 2018-04-30

## 2018-04-30 NOTE — TELEPHONE ENCOUNTER
Pt requesting hosp f/up, pt indicates she is having labs done weds morning, [place], pls call at:[phone],thanks.

## 2018-04-30 NOTE — TELEPHONE ENCOUNTER
Add Thursday we will need to extend night hours to 7pm  Discuss please w [facility].

## 2018-04-30 NOTE — TELEPHONE ENCOUNTER
Contacted pt.  Scheduled appt with [facility] for 5/3/18 at 6:40 PM.

## 2018-05-02 ENCOUNTER — APPOINTMENT (OUTPATIENT)
Dept: LAB | Facility: HOSPITAL | Age: 73
End: 2018-05-02
Attending: HOSPITALIST
Payer: MEDICARE

## 2018-05-02 DIAGNOSIS — E87.1 HYPONATREMIA: ICD-10-CM

## 2018-05-02 PROCEDURE — 80048 BASIC METABOLIC PNL TOTAL CA: CPT

## 2018-05-02 PROCEDURE — 36415 COLL VENOUS BLD VENIPUNCTURE: CPT

## 2018-05-03 ENCOUNTER — OFFICE VISIT (OUTPATIENT)
Dept: NEPHROLOGY | Facility: CLINIC | Age: 73
End: 2018-05-03

## 2018-05-03 VITALS
DIASTOLIC BLOOD PRESSURE: 78 MMHG | SYSTOLIC BLOOD PRESSURE: 148 MMHG | HEIGHT: 68 IN | WEIGHT: 222.63 LBS | BODY MASS INDEX: 33.74 KG/M2 | HEART RATE: 79 BPM

## 2018-05-03 DIAGNOSIS — E87.1 HYPONATREMIA: Primary | ICD-10-CM

## 2018-05-03 PROCEDURE — 1111F DSCHRG MED/CURRENT MED MERGE: CPT | Performed by: INTERNAL MEDICINE

## 2018-05-03 PROCEDURE — 99213 OFFICE O/P EST LOW 20 MIN: CPT | Performed by: INTERNAL MEDICINE

## 2018-05-03 PROCEDURE — G0463 HOSPITAL OUTPT CLINIC VISIT: HCPCS | Performed by: INTERNAL MEDICINE

## 2018-05-04 NOTE — PROGRESS NOTES
Dear Verona Klinefelter am seeing Kaylee Mcgee. She is lost 25 pounds with a weight loss program is doing very well but was in the hospital last week with hyponatremia.   Her sodium was 123 she was not on any thiazide diuretics she denied any new medicines was drinking

## 2018-05-15 PROCEDURE — 80053 COMPREHEN METABOLIC PANEL: CPT | Performed by: FAMILY MEDICINE

## 2018-08-28 ENCOUNTER — APPOINTMENT (OUTPATIENT)
Dept: LAB | Facility: HOSPITAL | Age: 73
End: 2018-08-28
Attending: FAMILY MEDICINE
Payer: MEDICARE

## 2018-08-28 PROCEDURE — 80053 COMPREHEN METABOLIC PANEL: CPT | Performed by: FAMILY MEDICINE

## 2018-08-28 PROCEDURE — 36415 COLL VENOUS BLD VENIPUNCTURE: CPT | Performed by: FAMILY MEDICINE

## 2018-10-11 ENCOUNTER — OFFICE VISIT (OUTPATIENT)
Dept: HEMATOLOGY/ONCOLOGY | Facility: HOSPITAL | Age: 73
End: 2018-10-11
Attending: INTERNAL MEDICINE
Payer: MEDICARE

## 2018-10-11 VITALS
BODY MASS INDEX: 29.25 KG/M2 | WEIGHT: 193 LBS | DIASTOLIC BLOOD PRESSURE: 77 MMHG | TEMPERATURE: 98 F | HEART RATE: 71 BPM | HEIGHT: 68 IN | SYSTOLIC BLOOD PRESSURE: 153 MMHG | RESPIRATION RATE: 16 BRPM

## 2018-10-11 DIAGNOSIS — C50.912 MALIGNANT NEOPLASM OF LEFT BREAST, STAGE 1, ESTROGEN RECEPTOR NEGATIVE (HCC): Primary | ICD-10-CM

## 2018-10-11 DIAGNOSIS — Z80.9 FAMILY HISTORY OF CANCER: ICD-10-CM

## 2018-10-11 DIAGNOSIS — Z17.1 MALIGNANT NEOPLASM OF LEFT BREAST, STAGE 1, ESTROGEN RECEPTOR NEGATIVE (HCC): Primary | ICD-10-CM

## 2018-10-11 PROCEDURE — 99213 OFFICE O/P EST LOW 20 MIN: CPT | Performed by: INTERNAL MEDICINE

## 2018-10-11 NOTE — PROGRESS NOTES
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 She returns today for follow-up. She is not aware of bone pain, chest wall recurrence, or shortness of breath.      Patient has been followi Constitutional: Negative for chills, fatigue and fever. HENT: Negative for congestion, dental problem, hearing loss and mouth sores. Eyes: Positive for visual disturbance.         S/p cataracts with bifocal lens implants, needs magnifying only is certa Budesonide-Formoterol Fumarate (SYMBICORT) 160-4.5 MCG/ACT Inhalation Aerosol INHALE 1 PUFF BY MOUTH TWICE DAILY (Patient taking differently: 2 puffs.  ) Disp: 1 Inhaler Rfl: 5   EPINEPHrine (EPIPEN 2-CRISTIAN) 0.3 MG/0.3ML Injection Solution Auto-injector Inje Social Needs      Financial resource strain: Not on file      Food insecurity - worry: Not on file      Food insecurity - inability: Not on file      Transportation needs - medical: Not on file      Transportation needs - non-medical: Not on file    Occu Wt Readings from Last 6 Encounters:  10/11/18 : 87.5 kg (193 lb)  05/15/18 : 98.9 kg (218 lb)  05/03/18 : 101 kg (222 lb 9.6 oz)  04/28/18 : 102.5 kg (225 lb 14.4 oz)  04/12/18 : 101.6 kg (224 lb)  02/12/18 : 102.1 kg (225 lb)  Patient initially weighed 24 Patient remains concerned with the risk of another malignancy based on her strong family history. Attempted to provide support and encouragement to patient.   Encouraged ongoing weight bearing exercise and a healthy diet with her weight loss program. * T score < -2.5 at the femoral neck or spine after appropriate evaluation to exclude secondary causes.   * Low bone mass (T score between -1.0 and -2.5 at the femoral neck or spine) and a 10-year probability of a hip fracture > 3% or a 10-year probability Lymphocytes %      % 34   Monocytes %      % 12   Eosinophils %      % 2   Basophils %      % 1   Neutrophils Absolute      1.8 - 7.7 K/UL 3.0   Lymphocytes Absolute      1.0 - 4.0 K/UL 1.9   Monocytes Absolute      0.0 - 1.0 K/UL 0.7   Eosinophils Absolut

## 2019-03-21 PROBLEM — D12.6 BENIGN NEOPLASM OF COLON: Status: ACTIVE | Noted: 2019-03-21

## 2019-04-11 ENCOUNTER — OFFICE VISIT (OUTPATIENT)
Dept: HEMATOLOGY/ONCOLOGY | Facility: HOSPITAL | Age: 74
End: 2019-04-11
Attending: INTERNAL MEDICINE
Payer: MEDICARE

## 2019-04-11 VITALS
BODY MASS INDEX: 31 KG/M2 | OXYGEN SATURATION: 100 % | DIASTOLIC BLOOD PRESSURE: 71 MMHG | HEART RATE: 88 BPM | RESPIRATION RATE: 16 BRPM | WEIGHT: 202.38 LBS | SYSTOLIC BLOOD PRESSURE: 129 MMHG | TEMPERATURE: 98 F

## 2019-04-11 DIAGNOSIS — B02.9 HERPES ZOSTER INFECTION OF THORACIC REGION: ICD-10-CM

## 2019-04-11 DIAGNOSIS — I10 ESSENTIAL HYPERTENSION: ICD-10-CM

## 2019-04-11 DIAGNOSIS — Z80.9 FAMILY HISTORY OF CANCER: ICD-10-CM

## 2019-04-11 DIAGNOSIS — Z17.1 MALIGNANT NEOPLASM OF RIGHT BREAST, STAGE 1, ESTROGEN RECEPTOR NEGATIVE (HCC): Primary | ICD-10-CM

## 2019-04-11 DIAGNOSIS — C50.911 MALIGNANT NEOPLASM OF RIGHT BREAST, STAGE 1, ESTROGEN RECEPTOR NEGATIVE (HCC): Primary | ICD-10-CM

## 2019-04-11 DIAGNOSIS — J45.20 MILD INTERMITTENT ASTHMA WITHOUT COMPLICATION: ICD-10-CM

## 2019-04-11 PROCEDURE — 99211 OFF/OP EST MAY X REQ PHY/QHP: CPT

## 2019-04-21 PROBLEM — B02.9 HERPES ZOSTER INFECTION OF THORACIC REGION: Status: ACTIVE | Noted: 2019-04-21

## 2019-04-22 NOTE — PROGRESS NOTES
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 She returns today for follow-up. She is not aware of bone pain, chest wall recurrence, or shortness of breath.      Patient noted some discomf BRCA1 and BRCA2 - no mutation detected         12/17/2013 Surgery     RIGHT BREAST MASTECTOMY WITH SENTINEL LYMPH NODE BIOPSY AND LEFT BREAST MASTECTOMY: Rt. Breast-Invasive ductal carcinoma, Chelsy grade 2 measuring 8 mm in greatest dimension.  Lt Br Sad when thinking of her  who had Parkinson's and lung cancer. Current Outpatient Medications:  amLODIPine Besy-Benazepril HCl 5-10 MG Oral Cap Take 1 capsule by mouth daily.  Disp: 90 capsule Rfl: 3   Famciclovir 500 MG Oral Tab Take • Mixed hyperlipidemia 4/12/2018   • Port-A-Cath in place 2/24/2014   • PUD (peptic ulcer disease) 2012    /esophagitis / EGD with biopsy   • S/P colonoscopy 2009     Past Surgical History:   Procedure Laterality Date   • FINE NEEDLE ASPIRATION; W/ IMAGING Relationship status: Not on file      Intimate partner violence:        Fear of current or ex partner: Not on file        Emotionally abused: Not on file        Physically abused: Not on file        Forced sexual activity: Not on file    Other Topics Eyes: Pupils are equal, round, and reactive to light. Conjunctivae and EOM are normal. No scleral icterus. Neck: Normal range of motion. Neck supple. Cardiovascular: Normal rate, regular rhythm and normal heart sounds.    Pulmonary/Chest: Effort normal Patient remains concerned with the risk of another malignancy based on her strong family history. Patient's mother and sister had breast cancer, daughter colon cancer, and a paternal cousin has a history of ovarian cancer.     25 total minutes spent with p * Low bone mass (T score between -1.0 and -2.5 at the femoral neck or spine) and a 10-year probability of a hip fracture > 3% or a 10-year probability of a major osteoporosis-related fracture > 20% based on the [place]-adapted WHO algorithm      Dictated by (CS Neutrophils Absolute      1.8 - 7.7 K/UL 3.0   Lymphocytes Absolute      1.0 - 4.0 K/UL 1.9   Monocytes Absolute      0.0 - 1.0 K/UL 0.7   Eosinophils Absolute      0.0 - 0.7 K/UL 0.1   Basophils Absolute      0.0 - 0.2 K/UL 0.1   HDL Cholesterol      mg/d

## 2019-10-11 ENCOUNTER — OFFICE VISIT (OUTPATIENT)
Dept: HEMATOLOGY/ONCOLOGY | Facility: HOSPITAL | Age: 74
End: 2019-10-11
Attending: INTERNAL MEDICINE
Payer: MEDICARE

## 2019-10-11 VITALS
OXYGEN SATURATION: 100 % | HEIGHT: 68 IN | RESPIRATION RATE: 16 BRPM | BODY MASS INDEX: 33.8 KG/M2 | DIASTOLIC BLOOD PRESSURE: 77 MMHG | WEIGHT: 223 LBS | TEMPERATURE: 98 F | SYSTOLIC BLOOD PRESSURE: 132 MMHG | HEART RATE: 80 BPM

## 2019-10-11 DIAGNOSIS — Z80.9 FAMILY HISTORY OF CANCER: ICD-10-CM

## 2019-10-11 DIAGNOSIS — Z17.1 MALIGNANT NEOPLASM OF RIGHT BREAST, STAGE 1, ESTROGEN RECEPTOR NEGATIVE (HCC): Primary | ICD-10-CM

## 2019-10-11 DIAGNOSIS — C50.911 MALIGNANT NEOPLASM OF RIGHT BREAST, STAGE 1, ESTROGEN RECEPTOR NEGATIVE (HCC): Primary | ICD-10-CM

## 2019-10-11 DIAGNOSIS — Z92.21 PERSONAL HISTORY OF CHEMOTHERAPY: ICD-10-CM

## 2019-10-11 PROCEDURE — 99213 OFFICE O/P EST LOW 20 MIN: CPT | Performed by: INTERNAL MEDICINE

## 2019-10-11 NOTE — PROGRESS NOTES
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 She returns today for follow-up. She is not aware of bone pain, chest wall recurrence, or shortness of breath.      Patient noted RIGHT BREAST MASTECTOMY WITH SENTINEL LYMPH NODE BIOPSY AND LEFT BREAST MASTECTOMY: Rt. Breast-Invasive ductal carcinoma, [place] grade 2 measuring 8 mm in greatest dimension. Lt Breast-no evidence of malignancy.       12/17/2013 Cancer Staged     pT1b Hematological: Negative for adenopathy. Does not bruise/bleed easily. Psychiatric/Behavioral: Negative for dysphoric mood and sleep disturbance. Sad when thinking of her  who had Parkinson's and lung cancer.           Current Outpatient Medi • Malignant neoplasm of right breast, stage 1, estrogen receptor negative ([facility] [address] 12/2/2013   • Mixed hyperlipidemia 4/12/2018   • Port-A-Cath in place 2/24/2014   • PUD (peptic ulcer disease) 2012    /esophagitis / EGD with biopsy   • S/P colonoscopy 2009 Active member of club or organization: Not on file        Attends meetings of clubs or organizations: Not on file        Relationship status: Not on file      Intimate partner violence:        Fear of current or ex partner: Not on file        Emotion Head: Normocephalic and atraumatic. Mouth/Throat: No oropharyngeal exudate. Eyes: Pupils are equal, round, and reactive to light. Conjunctivae and EOM are normal. No scleral icterus. Neck: Normal range of motion. Neck supple.    Cardiovascular: Normal Patient remains concerned with the risk of another malignancy based on her strong family history. Patient's mother and sister had breast cancer, daughter colon cancer, and a paternal cousin has a history of ovarian cancer.     25 total minutes spent with p * Low bone mass (T score between -1.0 and -2.5 at the femoral neck or spine) and a 10-year probability of a hip fracture > 3% or a 10-year probability of a major osteoporosis-related fracture > 20% based on the [place]-adapted WHO algorithm      Dictated by (CS Neutrophils Absolute      1.8 - 7.7 K/UL 3.0   Lymphocytes Absolute      1.0 - 4.0 K/UL 1.9   Monocytes Absolute      0.0 - 1.0 K/UL 0.7   Eosinophils Absolute      0.0 - 0.7 K/UL 0.1   Basophils Absolute      0.0 - 0.2 K/UL 0.1   HDL Cholesterol      mg/d

## 2020-02-24 ENCOUNTER — HOSPITAL ENCOUNTER (OUTPATIENT)
Dept: GENERAL RADIOLOGY | Facility: HOSPITAL | Age: 75
Discharge: HOME | End: 2020-02-24
Attending: FAMILY MEDICINE
Payer: MEDICARE

## 2020-02-24 DIAGNOSIS — S29.8XXA BLUNT TRAUMA OF RIB, INITIAL ENCOUNTER: ICD-10-CM

## 2020-02-24 PROCEDURE — 71110 X-RAY EXAM RIBS BIL 3 VIEWS: CPT | Performed by: FAMILY MEDICINE

## 2020-04-16 ENCOUNTER — VIRTUAL PHONE E/M (OUTPATIENT)
Dept: HEMATOLOGY/ONCOLOGY | Facility: HOSPITAL | Age: 75
End: 2020-04-16
Attending: INTERNAL MEDICINE
Payer: MEDICARE

## 2020-04-16 DIAGNOSIS — Z17.1 MALIGNANT NEOPLASM OF RIGHT BREAST, STAGE 1, ESTROGEN RECEPTOR NEGATIVE (HCC): Primary | ICD-10-CM

## 2020-04-16 DIAGNOSIS — Z78.0 ASYMPTOMATIC MENOPAUSE: ICD-10-CM

## 2020-04-16 DIAGNOSIS — C50.911 MALIGNANT NEOPLASM OF RIGHT BREAST, STAGE 1, ESTROGEN RECEPTOR NEGATIVE (HCC): Primary | ICD-10-CM

## 2020-04-16 DIAGNOSIS — E55.9 VITAMIN D DEFICIENCY: Primary | ICD-10-CM

## 2020-04-16 PROCEDURE — 99442 PHONE E/M BY PHYS 11-20 MIN: CPT | Performed by: INTERNAL MEDICINE

## 2020-04-16 NOTE — PROGRESS NOTES
Virtual/Telephone Check-In    Monia Agueda verbally consents to a Virtual/Telephone Check-In service on 04/16/20. Patient understands and accepts financial responsibility for any deductible, co-insurance and/or co-pays associated with this service.     Du twice used Proair (once or twice a month)  Cardiovascular no palpitations, /80 at home  Gastrointestinal no diarrhea or constipation, nausea, vomiting,   Genito-Urinary no dysuria, or hematuria   Musculoskeletal sharp pain like a pop to her left ches daily., Disp: , Rfl:   •  Omega-3 Fatty Acids (KP FISH OIL) 1200 MG Oral Cap, Take  by mouth., Disp: , Rfl:   •  Glucos-Simon-MSM-Rl-J-NlPb-SeCu (DURAFLEX) Oral Tab, Take  by mouth., Disp: , Rfl:   •  Multiple Vitamin (MULTI-VITAMINS) Oral Tab, Take  by francisco tobacco: Never Used      Tobacco comment: quit smoking ~ 1972    Alcohol use: Yes      Comment: occcationally     Drug use: No         Exam:  Gen:  Alert and oriented  Lungs:  Speaking in full sentences, no increased work of breathing.          Assessment a

## 2020-04-17 ENCOUNTER — TELEPHONE (OUTPATIENT)
Dept: HEMATOLOGY/ONCOLOGY | Facility: HOSPITAL | Age: 75
End: 2020-04-17

## 2020-04-17 NOTE — TELEPHONE ENCOUNTER
Spoke with Joni Torres-- per Dr. Lynnetta Favre instruction she should get bone density when things with COVID have eased-- right now hospital is not scheduling DEXA's. Pt verbalizes understanding, number given for scheduling.  Instructed to get Vit D 25 drawn with labs

## 2020-05-21 ENCOUNTER — HOSPITAL ENCOUNTER (OUTPATIENT)
Dept: BONE DENSITY | Facility: HOSPITAL | Age: 75
Discharge: HOME | End: 2020-05-21
Attending: INTERNAL MEDICINE
Payer: MEDICARE

## 2020-05-21 DIAGNOSIS — Z78.0 ASYMPTOMATIC MENOPAUSE: ICD-10-CM

## 2020-05-21 DIAGNOSIS — E55.9 VITAMIN D DEFICIENCY: ICD-10-CM

## 2020-05-21 PROCEDURE — 77080 DXA BONE DENSITY AXIAL: CPT | Performed by: INTERNAL MEDICINE

## 2020-05-22 NOTE — PROGRESS NOTES
Please see your bone density report   You have osteopenia with some decrease in your bone density or strength compared to 2017.     You could benefit from treatment with a medication to increase your bone density  I tried to call you but no answer on your p

## 2020-09-22 ENCOUNTER — HOSPITAL ENCOUNTER (OUTPATIENT)
Dept: GENERAL RADIOLOGY | Facility: HOSPITAL | Age: 75
Discharge: HOME | End: 2020-09-22
Attending: FAMILY MEDICINE
Payer: MEDICARE

## 2020-09-22 DIAGNOSIS — J44.9 CHRONIC OBSTRUCTIVE PULMONARY DISEASE, UNSPECIFIED COPD TYPE (HCC): ICD-10-CM

## 2020-09-22 PROCEDURE — 71046 X-RAY EXAM CHEST 2 VIEWS: CPT | Performed by: FAMILY MEDICINE

## 2020-10-09 ENCOUNTER — HOSPITAL ENCOUNTER (OUTPATIENT)
Dept: ULTRASOUND IMAGING | Age: 75
Discharge: HOME | End: 2020-10-09
Attending: FAMILY MEDICINE
Payer: MEDICARE

## 2020-10-09 DIAGNOSIS — R10.13 DYSPEPSIA: ICD-10-CM

## 2020-10-09 PROCEDURE — 76705 ECHO EXAM OF ABDOMEN: CPT | Performed by: FAMILY MEDICINE

## 2020-10-13 ENCOUNTER — APPOINTMENT (OUTPATIENT)
Dept: LAB | Facility: HOSPITAL | Age: 75
End: 2020-10-13
Attending: FAMILY MEDICINE
Payer: MEDICARE

## 2020-10-13 DIAGNOSIS — Z20.822 ENCOUNTER FOR PREPROCEDURE SCREENING LABORATORY TESTING FOR COVID-19: ICD-10-CM

## 2020-10-13 DIAGNOSIS — Z01.812 ENCOUNTER FOR PREPROCEDURE SCREENING LABORATORY TESTING FOR COVID-19: ICD-10-CM

## 2020-10-15 ENCOUNTER — OFFICE VISIT (OUTPATIENT)
Dept: HEMATOLOGY/ONCOLOGY | Facility: HOSPITAL | Age: 75
End: 2020-10-15
Attending: INTERNAL MEDICINE
Payer: MEDICARE

## 2020-10-15 VITALS
HEART RATE: 98 BPM | SYSTOLIC BLOOD PRESSURE: 141 MMHG | WEIGHT: 246 LBS | TEMPERATURE: 98 F | HEIGHT: 68 IN | DIASTOLIC BLOOD PRESSURE: 87 MMHG | OXYGEN SATURATION: 99 % | BODY MASS INDEX: 37.28 KG/M2 | RESPIRATION RATE: 16 BRPM

## 2020-10-15 DIAGNOSIS — Z17.1 MALIGNANT NEOPLASM OF RIGHT BREAST, STAGE 1, ESTROGEN RECEPTOR NEGATIVE (HCC): Primary | ICD-10-CM

## 2020-10-15 DIAGNOSIS — E87.1 HYPONATREMIA: ICD-10-CM

## 2020-10-15 DIAGNOSIS — R10.13 EPIGASTRIC ABDOMINAL PAIN: ICD-10-CM

## 2020-10-15 DIAGNOSIS — C50.911 MALIGNANT NEOPLASM OF RIGHT BREAST, STAGE 1, ESTROGEN RECEPTOR NEGATIVE (HCC): Primary | ICD-10-CM

## 2020-10-15 DIAGNOSIS — Z80.9 FAMILY HISTORY OF CANCER: ICD-10-CM

## 2020-10-15 DIAGNOSIS — M85.89 OSTEOPENIA OF MULTIPLE SITES: ICD-10-CM

## 2020-10-15 DIAGNOSIS — J45.20 MILD INTERMITTENT ASTHMA WITHOUT COMPLICATION: ICD-10-CM

## 2020-10-15 DIAGNOSIS — E55.9 VITAMIN D DEFICIENCY: ICD-10-CM

## 2020-10-15 PROCEDURE — 99214 OFFICE O/P EST MOD 30 MIN: CPT | Performed by: INTERNAL MEDICINE

## 2020-10-15 NOTE — PROGRESS NOTES
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 She returns today for follow-up. She is not aware of bone pain or chest wall recurrence.   Patient has noted increasing shortness Cancer Staged    BRCA1 and BRCA2 - no mutation detected     12/17/2013 Surgery    RIGHT BREAST MASTECTOMY WITH SENTINEL LYMPH NODE BIOPSY AND LEFT BREAST MASTECTOMY: Rt. Breast-Invasive ductal carcinoma, [place] grade 2 measuring 8 mm in greatest dimens allergies. Neurological: Positive for headaches. Negative for light-headedness. Tingling in arm if she sleeps on it.   No severe postherpetic neuralgia  One tylenol or one ibuprofen will make the headache go away      Hematological: Negative for ad 4/16/2020   • Breast cancer ([facility] [address] 12/2/2013 12/17/2013:  RIGHT BREAST MASTECTOMY WITH SENTINEL LYMPH NODE BIOPSY AND LEFT BREAST MASTECTOMY   • Epigastric abdominal pain 10/15/2020   • Herpes zoster infection of thoracic region 4/21/2019   • High blood Days per week: Not on file        Minutes per session: Not on file      Stress: Not on file    Relationships      Social connections        Talks on phone: Not on file        Gets together: Not on file        Attends Holiness service: Not on file kg (240 lb)  10/11/19 : 101.2 kg (223 lb)  04/11/19 : 91.8 kg (202 lb 6.4 oz)  03/21/19 : 92.8 kg (204 lb 8 oz)   HENT:   Head: Normocephalic and atraumatic. Mouth/Throat: No oropharyngeal exudate. Eyes: Pupils are equal, round, and reactive to light. with the risk of another malignancy based on her strong family history. Patient's mother and sister had breast cancer, daughter colon cancer, and a paternal cousin has a history of ovarian cancer.  Patient did have BRCA1 and BRCA2 testing performed  were n 4. 6   CHLORIDE, SERUM      98 - 110 mmol/L 95 (L) 98   CARBON DIOXIDE      20 - 32 mmol/L 24 28   CALCIUM      8.6 - 10.4 mg/dL 9.5 9.6   PROTEIN, TOTAL      6.1 - 8.1 g/dL 7.1 7.2   Albumin      3.6 - 5.1 g/dL 4.4 4.2   Globulin, Total      1.9 - 3.7 g/dL

## 2020-10-16 ENCOUNTER — HOSPITAL ENCOUNTER (OUTPATIENT)
Dept: RESPIRATORY THERAPY | Facility: HOSPITAL | Age: 75
Discharge: HOME | End: 2020-10-16
Attending: FAMILY MEDICINE
Payer: MEDICARE

## 2020-10-16 ENCOUNTER — LAB ENCOUNTER (OUTPATIENT)
Dept: LAB | Facility: HOSPITAL | Age: 75
End: 2020-10-16
Attending: FAMILY MEDICINE
Payer: MEDICARE

## 2020-10-16 DIAGNOSIS — R06.02 SHORTNESS OF BREATH: ICD-10-CM

## 2020-10-16 DIAGNOSIS — E55.9 VITAMIN D DEFICIENCY: ICD-10-CM

## 2020-10-16 PROCEDURE — 94726 PLETHYSMOGRAPHY LUNG VOLUMES: CPT

## 2020-10-16 PROCEDURE — 94729 DIFFUSING CAPACITY: CPT

## 2020-10-16 PROCEDURE — 94060 EVALUATION OF WHEEZING: CPT

## 2020-10-16 PROCEDURE — 36415 COLL VENOUS BLD VENIPUNCTURE: CPT

## 2020-10-16 PROCEDURE — 82306 VITAMIN D 25 HYDROXY: CPT

## 2020-10-16 NOTE — PROCEDURES
Pulmonary Function Test Results     Impression: Normal PFT    To view the full PFT go to the Procedures tab and select the PFT order and click on the hyperlink under \"Scans on Order\".

## 2020-10-29 PROBLEM — Z80.0 FAMILY HISTORY OF COLON CANCER: Status: ACTIVE | Noted: 2020-10-29

## 2021-01-27 DIAGNOSIS — Z23 NEED FOR VACCINATION: ICD-10-CM

## 2021-01-31 ENCOUNTER — IMMUNIZATION (OUTPATIENT)
Dept: LAB | Age: 76
End: 2021-01-31
Attending: HOSPITALIST
Payer: MEDICARE

## 2021-01-31 DIAGNOSIS — Z23 NEED FOR VACCINATION: Primary | ICD-10-CM

## 2021-01-31 PROCEDURE — 0001A SARSCOV2 VAC 30MCG/0.3ML IM: CPT

## 2021-02-21 ENCOUNTER — IMMUNIZATION (OUTPATIENT)
Dept: LAB | Age: 76
End: 2021-02-21
Attending: HOSPITALIST
Payer: MEDICARE

## 2021-02-21 DIAGNOSIS — Z23 NEED FOR VACCINATION: Primary | ICD-10-CM

## 2021-02-21 PROCEDURE — 0002A SARSCOV2 VAC 30MCG/0.3ML IM: CPT

## 2021-04-15 ENCOUNTER — OFFICE VISIT (OUTPATIENT)
Dept: HEMATOLOGY/ONCOLOGY | Facility: HOSPITAL | Age: 76
End: 2021-04-15
Attending: INTERNAL MEDICINE
Payer: MEDICARE

## 2021-04-15 VITALS
HEART RATE: 93 BPM | HEIGHT: 68 IN | TEMPERATURE: 99 F | RESPIRATION RATE: 18 BRPM | OXYGEN SATURATION: 99 % | BODY MASS INDEX: 37.56 KG/M2 | SYSTOLIC BLOOD PRESSURE: 151 MMHG | DIASTOLIC BLOOD PRESSURE: 80 MMHG | WEIGHT: 247.81 LBS

## 2021-04-15 DIAGNOSIS — C50.911 MALIGNANT NEOPLASM OF RIGHT BREAST, STAGE 1, ESTROGEN RECEPTOR NEGATIVE (HCC): Primary | ICD-10-CM

## 2021-04-15 DIAGNOSIS — M85.89 OSTEOPENIA OF MULTIPLE SITES: ICD-10-CM

## 2021-04-15 DIAGNOSIS — E55.9 VITAMIN D DEFICIENCY: ICD-10-CM

## 2021-04-15 DIAGNOSIS — Z80.3 FAMILY HISTORY OF BREAST CANCER: ICD-10-CM

## 2021-04-15 DIAGNOSIS — Z17.1 MALIGNANT NEOPLASM OF RIGHT BREAST, STAGE 1, ESTROGEN RECEPTOR NEGATIVE (HCC): Primary | ICD-10-CM

## 2021-04-15 DIAGNOSIS — Z80.0 FAMILY HISTORY OF COLON CANCER: ICD-10-CM

## 2021-04-15 PROCEDURE — 99214 OFFICE O/P EST MOD 30 MIN: CPT | Performed by: INTERNAL MEDICINE

## 2021-04-15 NOTE — PROGRESS NOTES
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 She returns today for follow-up. She is not aware of bone pain or chest wall recurrence.       Patient has noted increasing shortn BIOPSY  -  fibroadenoma      11/19/2013 Biopsy    ULTRASOUND GUIDED FNA AND CORE BIOPSY RIGHT BREAST: invasive ductal carcinoma, grade 2. ER negative, DE negative, HER-2/liya negative, Ki-67 79%.      11/25/2013 Cancer Staged    BRCA1 and BRCA2 - no mutatio discomfort   Low back disc comfort is minimal and present when not standing straight  Osteopenia and now taking vitamin D   Skin: Negative for rash. Allergic/Immunologic: Positive for environmental allergies. Neurological: Positive for headaches.  Ben Turner Reported on 4/15/2021 ) 12 tablet 5     Allergies:     Pistachios              RESPIRATORY FAILURE  Dander                      Comment:Difficulty breathing  Penicillins             RESPIRATORY FAILURE     Past Medical History:   Diagnosis Date   • Reyes Young Not on file      Highest education level: Not on file    Occupational History      Not on file    Tobacco Use      Smoking status: Former Smoker        Packs/day: 1.00        Years: 10.00        Pack years: 10        Types: Cigarettes      Smokeless tobacc / age 74   • Breast Cancer Mother          of metastatic disease / age 59   • Cancer Father         bladder / 80years old   • Breast Cancer Sister         s/p lumpectomy / tamoifen   • Cancer Daughter         metastatic adenocarcinoma of the colon / 1 deficit. Psychiatric:         Behavior: Behavior normal.         Thought Content:  Thought content normal.         Judgment: Judgment normal.           ASSESSMENT/PLAN:   Malignant neoplasm of right breast, stage 1, estrogen receptor negative (hcc)  (prim Hemoglobin      11.7 - 15.5 g/dL 13.1 13.4   Hematocrit      35.0 - 45.0 % 38.1 37.6   MCV      80.0 - 100.0 fL 91.6 92.4   MCH      27.0 - 33.0 pg 31.5 32.9   MCHC      32.0 - 36.0 g/dL 34.4 35.6   RDW      11.0 - 15.0 % 11.8 12.1   Platelet Count (Direct)      0.8 - 1.8 ng/dL  1.2   VITAMIN D, 25-OH, TOTAL      30 - 100 ng/mL 27 (L)      5/21/2020 XR bone densitometry  CONCLUSION:   1. Mild osteopenia left femoral neck and moderate osteopenia lumbar spine. Normal bone density total left hip.   2. 5

## 2021-04-20 ENCOUNTER — HOSPITAL ENCOUNTER (OUTPATIENT)
Dept: CV DIAGNOSTICS | Facility: HOSPITAL | Age: 76
Discharge: HOME | End: 2021-04-20
Attending: FAMILY MEDICINE
Payer: MEDICARE

## 2021-04-20 ENCOUNTER — HOSPITAL ENCOUNTER (OUTPATIENT)
Dept: NUCLEAR MEDICINE | Facility: HOSPITAL | Age: 76
Discharge: HOME | End: 2021-04-20
Attending: FAMILY MEDICINE
Payer: MEDICARE

## 2021-04-20 DIAGNOSIS — R06.02 SHORTNESS OF BREATH ON EXERTION: ICD-10-CM

## 2021-04-20 PROCEDURE — 78452 HT MUSCLE IMAGE SPECT MULT: CPT | Performed by: FAMILY MEDICINE

## 2021-04-20 PROCEDURE — 93017 CV STRESS TEST TRACING ONLY: CPT | Performed by: FAMILY MEDICINE

## 2021-04-20 PROCEDURE — 93018 CV STRESS TEST I&R ONLY: CPT | Performed by: FAMILY MEDICINE

## 2021-04-20 PROCEDURE — 93016 CV STRESS TEST SUPVJ ONLY: CPT | Performed by: FAMILY MEDICINE

## 2021-05-03 ENCOUNTER — NURSE ONLY (OUTPATIENT)
Dept: HEMATOLOGY/ONCOLOGY | Facility: HOSPITAL | Age: 76
End: 2021-05-03
Attending: INTERNAL MEDICINE
Payer: MEDICARE

## 2021-05-03 ENCOUNTER — GENETICS ENCOUNTER (OUTPATIENT)
Dept: GENETICS | Facility: HOSPITAL | Age: 76
End: 2021-05-03
Attending: INTERNAL MEDICINE
Payer: MEDICARE

## 2021-05-03 DIAGNOSIS — Z80.3 FAMILY HISTORY OF BREAST CANCER: ICD-10-CM

## 2021-05-03 DIAGNOSIS — Z17.1 MALIGNANT NEOPLASM OF RIGHT BREAST, STAGE 1, ESTROGEN RECEPTOR NEGATIVE (HCC): Primary | ICD-10-CM

## 2021-05-03 DIAGNOSIS — Z80.0 FAMILY HISTORY OF COLON CANCER: ICD-10-CM

## 2021-05-03 DIAGNOSIS — C50.911 MALIGNANT NEOPLASM OF RIGHT BREAST, STAGE 1, ESTROGEN RECEPTOR NEGATIVE (HCC): Primary | ICD-10-CM

## 2021-05-03 PROCEDURE — 96040 HC GENETIC COUNSELING EA 30 MIN: CPT | Performed by: GENETIC COUNSELOR, MS

## 2021-05-03 PROCEDURE — 36415 COLL VENOUS BLD VENIPUNCTURE: CPT

## 2021-05-03 NOTE — PROGRESS NOTES
Reason for visit: Mrs. Sandra Alfaro is a 81-year-old woman referred to genetic counseling due to her previous diagnosis of triple negative breast cancer and her family history of breast and other cancers.   She was seen today to discuss the option of genetic test 72 and treated with lumpectomy and Tamoxifen. Her sister also had negative BRCA1/2 testing (again, in 2012.)  Both of her sister’s daughters have been diagnosed with breast cancer.   One niece was diagnosed at age 55 (and again, negative BRCA1/2 testing in hereditary cancer conditions are inherited. If an individual has such a cancer predisposing gene mutation, there is a 50% chance that any offspring will not inherit the mutation and a 50% chance that he or she will inherit it.   Inheriting the mutation baldwin mutations in high, moderate and unknown-penetrance cancer genes. Genetic testing could yield one of three results: no mutation detected, deleterious mutation detected, or variant of uncertain significance.   The implications of these potential results were

## 2021-05-09 ENCOUNTER — TELEPHONE (OUTPATIENT)
Dept: GENETICS | Facility: HOSPITAL | Age: 76
End: 2021-05-09

## 2021-06-17 NOTE — MR AVS SNAPSHOT
[address] at [address] [place] [address].  [address], [address] [address]  [phone]               Thank you for choosing us for your health care visit with Vangie Banuelos MD.  We are glad to serve you and happy to provid Problem: Falls - Risk of:  Goal: Will remain free from falls  Description: Will remain free from falls  6/17/2021 0320 by Dao Hunter RN  Outcome: Met This Shift     Problem: Falls - Risk of:  Goal: Absence of physical injury  Description: Absence of physical injury  6/17/2021 0320 by Dao Hunter RN  Outcome: Met This Shift     Problem: Discharge Planning:  Goal: Discharged to appropriate level of care  Description: Discharged to appropriate level of care  6/17/2021 0320 by Dao Hunter RN  Outcome: Not Met This Shift     Problem: Fluid Volume - Deficit:  Goal: Absence of fluid volume deficit signs and symptoms  Description: Absence of fluid volume deficit signs and symptoms  6/17/2021 0320 by Dao Hunter RN  Outcome: Met This Shift     Problem: Nutrition Deficit:  Goal: Ability to achieve adequate nutritional intake will improve  Description: Ability to achieve adequate nutritional intake will improve  6/17/2021 0320 by Dao Hunter RN  Outcome: Met This Shift     Problem: Sleep Pattern Disturbance:  Goal: Appears well-rested  Description: Appears well-rested  6/17/2021 0320 by Dao Hunter RN  Outcome: Met This Shift     Problem: Violence - Risk of, Self/Other-Directed:  Goal: Knowledge of developmental care interventions  Description: Absence of violence  6/17/2021 0320 by Dao Hunter RN  Outcome: Met This Shift     Problem: Pain:  Goal: Pain level will decrease  Description: Pain level will decrease  6/17/2021 0320 by Dao Hunter RN  Outcome: Met This Shift     Problem: Pain:  Goal: Control of acute pain  Description: Control of acute pain  6/17/2021 0320 by Dao Hunter RN  Outcome: Met This Shift     Problem: Pain:  Goal: Control of chronic pain  Description: Control of chronic pain  6/17/2021 0320 by Dao Hunter RN  Outcome: Met This Shift ---------- Medicare covers annually or at 6-month intervals for prediabetic patients        Cardiovascular Disease Screening     Cholesterol, covered every 5 yrs including Total, LDL and Trigs No results found for: LDL, LDLC, CHOLEST, TRIG     EKG - covere No results found for this or any previous visit. No flowsheet data found.     Recommended for 2 year anniversary or as ordered in After Visit Summary   Pap and Pelvic      Pap: Every 3 yrs age 21-65 or Pap+HPV every 5 yrs age 33-67, Covered every 2 yrs up t An information packet, including necessary form from the [employer] website. http://www. idph.state. [place]/public/books/advin.htm  A link to the Card Capture [employer].  This site has a lot of good information including definit screening covered service except at the Welcome to Medicare Visit    Abdominal aortic aneurysm screening (once between ages 73-68) IPPE only No results found for this or any previous visit.  Limited to patients who meet one of the following criteria:   • Me Screening Mammogram      Mammogram    Recommend Annually to at least age 76, and as needed after 76 Mammogram,1 Yr due on 03/11/1987 Please get this Mammogram regularly   Immunizations      Influenza  Covered Annually No orders found for this or any previo www.putitinwriting. org  This link also has information from the [address] [place] [address] regarding Advance Directives. Follow Up with Our Office     Return in 3 months (on 5/17/2017).       Your Appointments     Apr 13, 2017 12:30 PM   HEMATOLOGY Take 1 tablet (250 mg total) by mouth daily.    Commonly known as:  LAMISIL                Where to Get Your Medications      These medications were sent to [facility],  [address], [address] [facility] AT [address] [place] [address] [place], [address], [address]  [address]  [address] [place] [address]    [address]  [address] [address], [place]    It is the patient's responsibility to check information, go to [email or web address] and click on the Sign Up Now link in the [employer] box. Enter your [employer] Activation Code exactly as it appears below along with your Zip Code and Date of Birth to complete the sign-up process.  If you do

## 2021-09-18 ENCOUNTER — APPOINTMENT (OUTPATIENT)
Dept: CT IMAGING | Facility: HOSPITAL | Age: 76
End: 2021-09-18
Attending: EMERGENCY MEDICINE
Payer: MEDICARE

## 2021-09-18 ENCOUNTER — HOSPITAL ENCOUNTER (EMERGENCY)
Facility: HOSPITAL | Age: 76
Discharge: HOME | End: 2021-09-18
Attending: EMERGENCY MEDICINE
Payer: MEDICARE

## 2021-09-18 VITALS
HEIGHT: 68 IN | WEIGHT: 245 LBS | TEMPERATURE: 99 F | RESPIRATION RATE: 12 BRPM | DIASTOLIC BLOOD PRESSURE: 56 MMHG | OXYGEN SATURATION: 94 % | BODY MASS INDEX: 37.13 KG/M2 | SYSTOLIC BLOOD PRESSURE: 142 MMHG | HEART RATE: 89 BPM

## 2021-09-18 DIAGNOSIS — R00.2 PALPITATIONS: Primary | ICD-10-CM

## 2021-09-18 DIAGNOSIS — R51.9 NONINTRACTABLE HEADACHE, UNSPECIFIED CHRONICITY PATTERN, UNSPECIFIED HEADACHE TYPE: ICD-10-CM

## 2021-09-18 DIAGNOSIS — E87.1 HYPONATREMIA: ICD-10-CM

## 2021-09-18 LAB
ANION GAP SERPL CALC-SCNC: 6 MMOL/L (ref 0–18)
BASOPHILS # BLD AUTO: 0.04 X10(3) UL (ref 0–0.2)
BASOPHILS NFR BLD AUTO: 0.4 %
BUN BLD-MCNC: 11 MG/DL (ref 7–18)
BUN/CREAT SERPL: 14.1 (ref 10–20)
CALCIUM BLD-MCNC: 8.9 MG/DL (ref 8.5–10.1)
CHLORIDE SERPL-SCNC: 100 MMOL/L (ref 98–112)
CO2 SERPL-SCNC: 25 MMOL/L (ref 21–32)
CREAT BLD-MCNC: 0.78 MG/DL
D DIMER PPP FEU-MCNC: 0.43 UG/ML FEU (ref ?–0.74)
DEPRECATED RDW RBC AUTO: 39.8 FL (ref 35.1–46.3)
EOSINOPHIL # BLD AUTO: 0.02 X10(3) UL (ref 0–0.7)
EOSINOPHIL NFR BLD AUTO: 0.2 %
ERYTHROCYTE [DISTWIDTH] IN BLOOD BY AUTOMATED COUNT: 12.1 % (ref 11–15)
GLUCOSE BLD-MCNC: 137 MG/DL (ref 70–99)
HCT VFR BLD AUTO: 36.4 %
HGB BLD-MCNC: 12.7 G/DL
IMM GRANULOCYTES # BLD AUTO: 0.03 X10(3) UL (ref 0–1)
IMM GRANULOCYTES NFR BLD: 0.3 %
LYMPHOCYTES # BLD AUTO: 1.8 X10(3) UL (ref 1–4)
LYMPHOCYTES NFR BLD AUTO: 20.1 %
MAGNESIUM SERPL-MCNC: 2.1 MG/DL (ref 1.6–2.6)
MCH RBC QN AUTO: 31.3 PG (ref 26–34)
MCHC RBC AUTO-ENTMCNC: 34.9 G/DL (ref 31–37)
MCV RBC AUTO: 89.7 FL
MONOCYTES # BLD AUTO: 0.9 X10(3) UL (ref 0.1–1)
MONOCYTES NFR BLD AUTO: 10.1 %
NEUTROPHILS # BLD AUTO: 6.16 X10 (3) UL (ref 1.5–7.7)
NEUTROPHILS # BLD AUTO: 6.16 X10(3) UL (ref 1.5–7.7)
NEUTROPHILS NFR BLD AUTO: 68.9 %
OSMOLALITY SERPL CALC.SUM OF ELEC: 274 MOSM/KG (ref 275–295)
PLATELET # BLD AUTO: 308 10(3)UL (ref 150–450)
POTASSIUM SERPL-SCNC: 3.9 MMOL/L (ref 3.5–5.1)
RBC # BLD AUTO: 4.06 X10(6)UL
SODIUM SERPL-SCNC: 131 MMOL/L (ref 136–145)
TROPONIN I SERPL-MCNC: <0.045 NG/ML (ref ?–0.04)
TSI SER-ACNC: 2.8 MIU/ML (ref 0.36–3.74)
WBC # BLD AUTO: 9 X10(3) UL (ref 4–11)

## 2021-09-18 PROCEDURE — 93005 ELECTROCARDIOGRAM TRACING: CPT

## 2021-09-18 PROCEDURE — 96375 TX/PRO/DX INJ NEW DRUG ADDON: CPT

## 2021-09-18 PROCEDURE — 99285 EMERGENCY DEPT VISIT HI MDM: CPT

## 2021-09-18 PROCEDURE — 84484 ASSAY OF TROPONIN QUANT: CPT | Performed by: EMERGENCY MEDICINE

## 2021-09-18 PROCEDURE — 84443 ASSAY THYROID STIM HORMONE: CPT | Performed by: EMERGENCY MEDICINE

## 2021-09-18 PROCEDURE — 80048 BASIC METABOLIC PNL TOTAL CA: CPT | Performed by: EMERGENCY MEDICINE

## 2021-09-18 PROCEDURE — 83735 ASSAY OF MAGNESIUM: CPT | Performed by: EMERGENCY MEDICINE

## 2021-09-18 PROCEDURE — 96376 TX/PRO/DX INJ SAME DRUG ADON: CPT

## 2021-09-18 PROCEDURE — 93010 ELECTROCARDIOGRAM REPORT: CPT | Performed by: EMERGENCY MEDICINE

## 2021-09-18 PROCEDURE — 70450 CT HEAD/BRAIN W/O DYE: CPT | Performed by: EMERGENCY MEDICINE

## 2021-09-18 PROCEDURE — 85379 FIBRIN DEGRADATION QUANT: CPT | Performed by: EMERGENCY MEDICINE

## 2021-09-18 PROCEDURE — 96374 THER/PROPH/DIAG INJ IV PUSH: CPT

## 2021-09-18 PROCEDURE — 96361 HYDRATE IV INFUSION ADD-ON: CPT

## 2021-09-18 PROCEDURE — 85025 COMPLETE CBC W/AUTO DIFF WBC: CPT | Performed by: EMERGENCY MEDICINE

## 2021-09-18 RX ORDER — METOCLOPRAMIDE HYDROCHLORIDE 5 MG/ML
10 INJECTION INTRAMUSCULAR; INTRAVENOUS ONCE
Status: COMPLETED | OUTPATIENT
Start: 2021-09-18 | End: 2021-09-18

## 2021-09-18 RX ORDER — ORPHENADRINE CITRATE 30 MG/ML
30 INJECTION INTRAMUSCULAR; INTRAVENOUS ONCE
Status: COMPLETED | OUTPATIENT
Start: 2021-09-18 | End: 2021-09-18

## 2021-09-18 RX ORDER — CYCLOBENZAPRINE HCL 10 MG
10 TABLET ORAL 3 TIMES DAILY PRN
Qty: 15 TABLET | Refills: 0 | Status: SHIPPED | OUTPATIENT
Start: 2021-09-18 | End: 2021-09-23

## 2021-09-18 RX ORDER — DIPHENHYDRAMINE HYDROCHLORIDE 50 MG/ML
25 INJECTION INTRAMUSCULAR; INTRAVENOUS ONCE
Status: COMPLETED | OUTPATIENT
Start: 2021-09-18 | End: 2021-09-18

## 2021-09-18 RX ORDER — DIPHENHYDRAMINE HYDROCHLORIDE 50 MG/ML
12.5 INJECTION INTRAMUSCULAR; INTRAVENOUS ONCE
Status: COMPLETED | OUTPATIENT
Start: 2021-09-18 | End: 2021-09-18

## 2021-09-18 RX ORDER — METOCLOPRAMIDE 10 MG/1
10 TABLET ORAL EVERY 6 HOURS PRN
Qty: 20 TABLET | Refills: 0 | Status: SHIPPED | OUTPATIENT
Start: 2021-09-18 | End: 2021-09-23

## 2021-09-18 NOTE — ED PROVIDER NOTES
Patient Seen in: [facility] AND [facility] Emergency Department    History   Patient presents with:  Hypertension  Arrythmia/Palpitations    Stated Complaint: arrythmia     HPI    79 yo F with PMH BrCa s/p BL mastectomy, HTN, HL with palpitation/hypertension and c MASTECTOMY LEFT Left 12/17/2013    RIGHT BREAST MASTECTOMY WITH SENTINEL LYMPH NODE BIOPSY AND LEFT BREAST MASTECTOMY   • MASTECTOMY RIGHT Bilateral 12/17/2013    RIGHT BREAST MASTECTOMY WITH SENTINEL LYMPH NODE BIOPSY AND LEFT BREAST MASTECTOMY   • REPAIR Former Smoker        Packs/day: 1.00        Years: 10.00        Pack years: 10        Types: Cigarettes      Smokeless tobacco: Never Used      Tobacco comment: quit smoking ~ 1972    Alcohol use: Yes      Comment: occcationally     Drug use: No      Revie - Normal   CBC WITH DIFFERENTIAL WITH PLATELET    Narrative: The following orders were created for panel order CBC With Differential With Platelet.   Procedure                               Abnormality         Status                     --------- Interpretation: Pulse Readings from Last 1 Encounters:  09/18/21 : 110  , sinus,  with intermittent PVCs    Evaluation for palpitations/hypertension/cephalgia in neurologically intact patient without meningismus without associated CP/SOB in low risk HEART Medication List as of 9/18/2021  7:10 AM    START taking these medications    metoclopramide 10 MG Oral Tab  Take 1 tablet (10 mg total) by mouth every 6 (six) hours as needed (For headache/nausea/vomiting. )., Print, Disp-20 tablet, R-0    cyclobenzaprine

## 2021-09-18 NOTE — ED INITIAL ASSESSMENT (HPI)
Pt presents to ED for palpitations and hypertension at home. Pt also c/o posterior head pain. Pt denies chest pain.

## 2021-09-23 ENCOUNTER — HOSPITAL ENCOUNTER (OUTPATIENT)
Dept: CV DIAGNOSTICS | Facility: HOSPITAL | Age: 76
Discharge: HOME | End: 2021-09-23
Attending: FAMILY MEDICINE
Payer: MEDICARE

## 2021-09-23 DIAGNOSIS — R00.2 PALPITATIONS: ICD-10-CM

## 2021-09-23 PROCEDURE — 93225 XTRNL ECG REC<48 HRS REC: CPT | Performed by: FAMILY MEDICINE

## 2021-09-23 PROCEDURE — 93227 XTRNL ECG REC<48 HR R&I: CPT | Performed by: FAMILY MEDICINE

## 2021-09-30 ENCOUNTER — HOSPITAL ENCOUNTER (EMERGENCY)
Facility: HOSPITAL | Age: 76
Discharge: HOME | End: 2021-09-30
Attending: EMERGENCY MEDICINE
Payer: MEDICARE

## 2021-09-30 VITALS
RESPIRATION RATE: 16 BRPM | BODY MASS INDEX: 36.37 KG/M2 | HEIGHT: 68 IN | DIASTOLIC BLOOD PRESSURE: 85 MMHG | HEART RATE: 75 BPM | OXYGEN SATURATION: 96 % | TEMPERATURE: 99 F | SYSTOLIC BLOOD PRESSURE: 132 MMHG | WEIGHT: 240 LBS

## 2021-09-30 DIAGNOSIS — I49.3 VPC'S (VENTRICULAR PREMATURE COMPLEXES): Primary | ICD-10-CM

## 2021-09-30 PROCEDURE — 93010 ELECTROCARDIOGRAM REPORT: CPT | Performed by: EMERGENCY MEDICINE

## 2021-09-30 PROCEDURE — 99284 EMERGENCY DEPT VISIT MOD MDM: CPT

## 2021-09-30 PROCEDURE — 93005 ELECTROCARDIOGRAM TRACING: CPT

## 2021-09-30 PROCEDURE — 80048 BASIC METABOLIC PNL TOTAL CA: CPT | Performed by: EMERGENCY MEDICINE

## 2021-09-30 PROCEDURE — 84484 ASSAY OF TROPONIN QUANT: CPT | Performed by: EMERGENCY MEDICINE

## 2021-09-30 PROCEDURE — 85025 COMPLETE CBC W/AUTO DIFF WBC: CPT | Performed by: EMERGENCY MEDICINE

## 2021-09-30 PROCEDURE — 36415 COLL VENOUS BLD VENIPUNCTURE: CPT

## 2021-09-30 PROCEDURE — 83735 ASSAY OF MAGNESIUM: CPT | Performed by: EMERGENCY MEDICINE

## 2021-09-30 NOTE — ED PROVIDER NOTES
Patient Seen in: [facility] AND [facility] Emergency Department      History   Patient presents with:  Arrythmia/Palpitations    Stated Complaint: Neck; Palpatations    Subjective:   HPI    Patient is a 28-year-old female that was seen here 10 days ago for some person, place, and time. Appears well-developed and well-nourished. HEENT:   Head: Normocephalic and atraumatic.    Right Ear: External ear normal.   Left Ear: External ear normal.   Nose: Nose normal.   Mouth/Throat: Oropharynx is clear and moist.   Eyes Report. Rate: 110  Rhythm: Sinus Rhythm  Reading: Sinus tachycardia PVC              Called cardiac diagnostics for Holter monitor results results noted.          MDM      CT BRAIN OR HEAD ([id number])    Result Date: 9/18/2021  CONCLUSION:  Mild chronic microv

## 2021-10-14 ENCOUNTER — OFFICE VISIT (OUTPATIENT)
Dept: HEMATOLOGY/ONCOLOGY | Facility: HOSPITAL | Age: 76
End: 2021-10-14
Attending: INTERNAL MEDICINE
Payer: MEDICARE

## 2021-10-14 VITALS
HEART RATE: 90 BPM | OXYGEN SATURATION: 99 % | RESPIRATION RATE: 18 BRPM | DIASTOLIC BLOOD PRESSURE: 81 MMHG | SYSTOLIC BLOOD PRESSURE: 148 MMHG | WEIGHT: 235 LBS | HEIGHT: 68 IN | TEMPERATURE: 98 F | BODY MASS INDEX: 35.61 KG/M2

## 2021-10-14 DIAGNOSIS — Z80.3 FAMILY HISTORY OF BREAST CANCER: ICD-10-CM

## 2021-10-14 DIAGNOSIS — Z80.0 FAMILY HISTORY OF COLON CANCER: ICD-10-CM

## 2021-10-14 DIAGNOSIS — E87.1 HYPONATREMIA: ICD-10-CM

## 2021-10-14 DIAGNOSIS — Z17.1 MALIGNANT NEOPLASM OF RIGHT BREAST, STAGE 1, ESTROGEN RECEPTOR NEGATIVE (HCC): Primary | ICD-10-CM

## 2021-10-14 DIAGNOSIS — Z90.13 S/P MASTECTOMY, BILATERAL: ICD-10-CM

## 2021-10-14 DIAGNOSIS — M85.89 OSTEOPENIA OF MULTIPLE SITES: ICD-10-CM

## 2021-10-14 DIAGNOSIS — C50.911 MALIGNANT NEOPLASM OF RIGHT BREAST, STAGE 1, ESTROGEN RECEPTOR NEGATIVE (HCC): Primary | ICD-10-CM

## 2021-10-14 PROCEDURE — 99214 OFFICE O/P EST MOD 30 MIN: CPT | Performed by: INTERNAL MEDICINE

## 2021-10-14 NOTE — PROGRESS NOTES
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   Patient is status post bilateral mastectomies with no evidence of disease in the left breast.  She is not aware of bone pain or c vaccine in September 2, 2020    She was spending 2 hours in the pool 4-5 times a week at her [address] to [address] Her goal weight was 168 pounds and patient comes today with pictures of her appearing thin.  One son lives with her and grandchildr distention, abdominal pain, constipation, diarrhea, nausea and vomiting.         Due for colonoscopy and EGD at the end of the month   Now on omeprazole - no stomach problem if she takes the medicine or eats a small meal  GB US + for stones 10/9/2020   Jami allergic reaction 1 each 0   • Omega-3 Fatty Acids (KP FISH OIL) 1200 MG Oral Cap Take  by mouth. • Glucos-Simon-MSM-Ez-S-UwVy-SeCu (DURAFLEX) Oral Tab Take  by mouth. • Multiple Vitamin (MULTI-VITAMINS) Oral Tab Take  by mouth.        Allergies: BREAST MASTECTOMY   • REPAIR ACHILLES TENDON,PRIMARY Right      Social History    Socioeconomic History      Marital status:        Spouse name: Not on file      Number of children: Not on file      Years of education: Not on file      Highest educat file      Active Member of Clubs or Organizations: Not on file      Attends Club or Organization Meetings: Not on file      Marital Status: Not on file  Intimate Partner Violence:       Fear of Current or Ex-Partner: Not on file      Emotionally Abused: No Effort: Pulmonary effort is normal. No respiratory distress. Breath sounds: Normal breath sounds. No wheezing. Chest:      Chest wall: No tenderness. Abdominal:      General: Bowel sounds are normal. There is no distension.       Palpation a normal EGD and diverticulosis. Patient remains concerned with the risk of another malignancy based on her strong family history of multiple malignancies and the health of the next generation.   Patient did have BRCA1 and BRCA2 testing performed  were n 4.0 3.9   Chloride      98 - 112 mmol/L 102 100   Carbon Dioxide, Total      21.0 - 32.0 mmol/L 24.0 25.0   ANION GAP      0 - 18 mmol/L 7 6   BUN      7 - 18 mg/dL 8 11   CREATININE      0.55 - 1.02 mg/dL 0.69 0.78   BUN/CREAT Ratio      10.0 - 20.0 11.6

## 2021-10-15 ENCOUNTER — APPOINTMENT (OUTPATIENT)
Dept: HEMATOLOGY/ONCOLOGY | Facility: HOSPITAL | Age: 76
End: 2021-10-15
Attending: INTERNAL MEDICINE
Payer: MEDICARE

## 2021-10-18 ENCOUNTER — LAB ENCOUNTER (OUTPATIENT)
Dept: LAB | Facility: HOSPITAL | Age: 76
End: 2021-10-18
Attending: FAMILY MEDICINE
Payer: MEDICARE

## 2021-10-18 DIAGNOSIS — E55.9 VITAMIN D DEFICIENCY: ICD-10-CM

## 2021-10-18 PROCEDURE — 36415 COLL VENOUS BLD VENIPUNCTURE: CPT

## 2021-10-18 PROCEDURE — 82306 VITAMIN D 25 HYDROXY: CPT

## 2022-02-04 ENCOUNTER — APPOINTMENT (OUTPATIENT)
Dept: GENERAL RADIOLOGY | Facility: HOSPITAL | Age: 77
End: 2022-02-04
Attending: EMERGENCY MEDICINE
Payer: MEDICARE

## 2022-02-04 ENCOUNTER — HOSPITAL ENCOUNTER (EMERGENCY)
Facility: HOSPITAL | Age: 77
Discharge: HOME | End: 2022-02-04
Attending: EMERGENCY MEDICINE
Payer: MEDICARE

## 2022-02-04 ENCOUNTER — APPOINTMENT (OUTPATIENT)
Dept: MRI IMAGING | Facility: HOSPITAL | Age: 77
End: 2022-02-04
Attending: EMERGENCY MEDICINE
Payer: MEDICARE

## 2022-02-04 VITALS
RESPIRATION RATE: 16 BRPM | WEIGHT: 230 LBS | SYSTOLIC BLOOD PRESSURE: 150 MMHG | DIASTOLIC BLOOD PRESSURE: 69 MMHG | HEART RATE: 69 BPM | BODY MASS INDEX: 35 KG/M2 | OXYGEN SATURATION: 97 % | TEMPERATURE: 99 F

## 2022-02-04 DIAGNOSIS — R42 DIZZINESS: ICD-10-CM

## 2022-02-04 DIAGNOSIS — E87.1 HYPONATREMIA: Primary | ICD-10-CM

## 2022-02-04 LAB
ANION GAP SERPL CALC-SCNC: 7 MMOL/L (ref 0–18)
BASOPHILS # BLD AUTO: 0.07 X10(3) UL (ref 0–0.2)
BASOPHILS NFR BLD AUTO: 0.5 %
BILIRUB UR QL: NEGATIVE
BUN BLD-MCNC: 15 MG/DL (ref 7–18)
BUN/CREAT SERPL: 17.2 (ref 10–20)
CALCIUM BLD-MCNC: 9.8 MG/DL (ref 8.5–10.1)
CHLORIDE SERPL-SCNC: 92 MMOL/L (ref 98–112)
CLARITY UR: CLEAR
CO2 SERPL-SCNC: 26 MMOL/L (ref 21–32)
COLOR UR: YELLOW
CREAT BLD-MCNC: 0.87 MG/DL
DEPRECATED RDW RBC AUTO: 38.4 FL (ref 35.1–46.3)
EOSINOPHIL # BLD AUTO: 0.02 X10(3) UL (ref 0–0.7)
EOSINOPHIL NFR BLD AUTO: 0.1 %
ERYTHROCYTE [DISTWIDTH] IN BLOOD BY AUTOMATED COUNT: 11.9 % (ref 11–15)
GLUCOSE BLD-MCNC: 114 MG/DL (ref 70–99)
GLUCOSE UR-MCNC: NEGATIVE MG/DL
HCT VFR BLD AUTO: 38 %
HGB BLD-MCNC: 13.4 G/DL
HGB UR QL STRIP.AUTO: NEGATIVE
IMM GRANULOCYTES # BLD AUTO: 0.11 X10(3) UL (ref 0–1)
IMM GRANULOCYTES NFR BLD: 0.8 %
KETONES UR-MCNC: NEGATIVE MG/DL
LEUKOCYTE ESTERASE UR QL STRIP.AUTO: NEGATIVE
LYMPHOCYTES # BLD AUTO: 3.87 X10(3) UL (ref 1–4)
LYMPHOCYTES NFR BLD AUTO: 26.5 %
MCH RBC QN AUTO: 31.6 PG (ref 26–34)
MCHC RBC AUTO-ENTMCNC: 35.3 G/DL (ref 31–37)
MCV RBC AUTO: 89.6 FL
MONOCYTES # BLD AUTO: 1.37 X10(3) UL (ref 0.1–1)
MONOCYTES NFR BLD AUTO: 9.4 %
NEUTROPHILS # BLD AUTO: 9.16 X10 (3) UL (ref 1.5–7.7)
NEUTROPHILS # BLD AUTO: 9.16 X10(3) UL (ref 1.5–7.7)
NEUTROPHILS NFR BLD AUTO: 62.7 %
NITRITE UR QL STRIP.AUTO: NEGATIVE
OSMOLALITY SERPL CALC.SUM OF ELEC: 262 MOSM/KG (ref 275–295)
PH UR: 6 [PH] (ref 5–8)
PLATELET # BLD AUTO: 386 10(3)UL (ref 150–450)
POTASSIUM SERPL-SCNC: 4.1 MMOL/L (ref 3.5–5.1)
PROT UR-MCNC: NEGATIVE MG/DL
RBC # BLD AUTO: 4.24 X10(6)UL
SARS-COV-2 RNA RESP QL NAA+PROBE: NOT DETECTED
SODIUM SERPL-SCNC: 125 MMOL/L (ref 136–145)
SP GR UR STRIP: 1.01 (ref 1–1.03)
TROPONIN I HIGH SENSITIVITY: 4 NG/L
UROBILINOGEN UR STRIP-ACNC: 2
WBC # BLD AUTO: 14.6 X10(3) UL (ref 4–11)

## 2022-02-04 PROCEDURE — 71045 X-RAY EXAM CHEST 1 VIEW: CPT | Performed by: EMERGENCY MEDICINE

## 2022-02-04 PROCEDURE — 81001 URINALYSIS AUTO W/SCOPE: CPT | Performed by: EMERGENCY MEDICINE

## 2022-02-04 PROCEDURE — 93010 ELECTROCARDIOGRAM REPORT: CPT | Performed by: EMERGENCY MEDICINE

## 2022-02-04 PROCEDURE — 99285 EMERGENCY DEPT VISIT HI MDM: CPT

## 2022-02-04 PROCEDURE — 70551 MRI BRAIN STEM W/O DYE: CPT | Performed by: EMERGENCY MEDICINE

## 2022-02-04 PROCEDURE — 96360 HYDRATION IV INFUSION INIT: CPT

## 2022-02-04 PROCEDURE — 93005 ELECTROCARDIOGRAM TRACING: CPT

## 2022-02-04 PROCEDURE — 96361 HYDRATE IV INFUSION ADD-ON: CPT

## 2022-02-04 PROCEDURE — 85025 COMPLETE CBC W/AUTO DIFF WBC: CPT | Performed by: EMERGENCY MEDICINE

## 2022-02-04 PROCEDURE — 84484 ASSAY OF TROPONIN QUANT: CPT | Performed by: EMERGENCY MEDICINE

## 2022-02-04 PROCEDURE — 80048 BASIC METABOLIC PNL TOTAL CA: CPT | Performed by: EMERGENCY MEDICINE

## 2022-02-05 NOTE — ED QUICK NOTES
Pt provided with discharge instructions. Verbalized understanding for plan of care at home and follow up. All questions/concerns addressed prior to discharge. IV removed, catheter intact.   Pt wheeled of ed with ert, driven home by son

## 2022-02-05 NOTE — ED QUICK NOTES
Pt c/o dizziness since yesterday, mostly when she is up and walking. States the dizziness resolves when laying down. Denies cp, sob. Pt also notes some head tightness. Neuro intact. Will continue to monitor.

## 2022-02-05 NOTE — ED QUICK NOTES
Ambulated pt after fluid bolus complete. Pt ambulatory with steady gait, but states that she still feels dizzy when standing up, states it feels the same as it did earlier today.

## 2022-02-05 NOTE — ED INITIAL ASSESSMENT (HPI)
Pt c/o dizziness x24 hours. Pt states that dizziness is better when lying down but is worse when sitting up. Denies CP/ELTON.

## 2022-04-11 ENCOUNTER — OFFICE VISIT (OUTPATIENT)
Dept: HEMATOLOGY/ONCOLOGY | Facility: HOSPITAL | Age: 77
End: 2022-04-11
Attending: INTERNAL MEDICINE
Payer: MEDICARE

## 2022-04-11 VITALS
TEMPERATURE: 99 F | HEART RATE: 112 BPM | DIASTOLIC BLOOD PRESSURE: 85 MMHG | RESPIRATION RATE: 18 BRPM | SYSTOLIC BLOOD PRESSURE: 150 MMHG | WEIGHT: 243 LBS | BODY MASS INDEX: 36.83 KG/M2 | OXYGEN SATURATION: 99 % | HEIGHT: 68 IN

## 2022-04-11 DIAGNOSIS — Z90.13 S/P MASTECTOMY, BILATERAL: ICD-10-CM

## 2022-04-11 DIAGNOSIS — Z17.1 MALIGNANT NEOPLASM OF RIGHT BREAST, STAGE 1, ESTROGEN RECEPTOR NEGATIVE (HCC): Primary | ICD-10-CM

## 2022-04-11 DIAGNOSIS — C50.911 MALIGNANT NEOPLASM OF RIGHT BREAST, STAGE 1, ESTROGEN RECEPTOR NEGATIVE (HCC): Primary | ICD-10-CM

## 2022-04-11 PROCEDURE — 99213 OFFICE O/P EST LOW 20 MIN: CPT | Performed by: INTERNAL MEDICINE

## 2022-04-14 ENCOUNTER — APPOINTMENT (OUTPATIENT)
Dept: HEMATOLOGY/ONCOLOGY | Facility: HOSPITAL | Age: 77
End: 2022-04-14
Attending: INTERNAL MEDICINE
Payer: MEDICARE

## 2022-05-20 ENCOUNTER — HOSPITAL ENCOUNTER (OUTPATIENT)
Dept: BONE DENSITY | Facility: HOSPITAL | Age: 77
Discharge: HOME | End: 2022-05-20
Attending: INTERNAL MEDICINE
Payer: MEDICARE

## 2022-05-20 ENCOUNTER — LAB ENCOUNTER (OUTPATIENT)
Dept: LAB | Facility: HOSPITAL | Age: 77
End: 2022-05-20
Attending: INTERNAL MEDICINE
Payer: MEDICARE

## 2022-05-20 DIAGNOSIS — M85.89 OSTEOPENIA OF MULTIPLE SITES: ICD-10-CM

## 2022-05-20 DIAGNOSIS — E55.9 VITAMIN D DEFICIENCY: ICD-10-CM

## 2022-05-20 LAB — VIT D+METAB SERPL-MCNC: 32.5 NG/ML (ref 30–100)

## 2022-05-20 PROCEDURE — 80061 LIPID PANEL: CPT | Performed by: FAMILY MEDICINE

## 2022-05-20 PROCEDURE — 77080 DXA BONE DENSITY AXIAL: CPT | Performed by: INTERNAL MEDICINE

## 2022-05-20 PROCEDURE — 85025 COMPLETE CBC W/AUTO DIFF WBC: CPT | Performed by: FAMILY MEDICINE

## 2022-05-20 PROCEDURE — 80053 COMPREHEN METABOLIC PANEL: CPT | Performed by: FAMILY MEDICINE

## 2022-05-20 PROCEDURE — 82306 VITAMIN D 25 HYDROXY: CPT

## 2022-05-20 PROCEDURE — 36415 COLL VENOUS BLD VENIPUNCTURE: CPT | Performed by: FAMILY MEDICINE

## 2022-07-12 ENCOUNTER — HOSPITAL ENCOUNTER (OUTPATIENT)
Dept: GENERAL RADIOLOGY | Facility: HOSPITAL | Age: 77
Discharge: HOME | End: 2022-07-12
Attending: FAMILY MEDICINE
Payer: MEDICARE

## 2022-07-12 ENCOUNTER — EKG ENCOUNTER (OUTPATIENT)
Dept: LAB | Facility: HOSPITAL | Age: 77
End: 2022-07-12
Attending: FAMILY MEDICINE
Payer: MEDICARE

## 2022-07-12 DIAGNOSIS — Z01.818 PREOP TESTING: Primary | ICD-10-CM

## 2022-07-12 DIAGNOSIS — Z01.818 PREOP TESTING: ICD-10-CM

## 2022-07-12 PROCEDURE — 71046 X-RAY EXAM CHEST 2 VIEWS: CPT | Performed by: FAMILY MEDICINE

## 2022-07-12 PROCEDURE — 93005 ELECTROCARDIOGRAM TRACING: CPT

## 2022-07-12 PROCEDURE — 93010 ELECTROCARDIOGRAM REPORT: CPT | Performed by: FAMILY MEDICINE

## 2022-07-12 PROCEDURE — 36415 COLL VENOUS BLD VENIPUNCTURE: CPT | Performed by: FAMILY MEDICINE

## 2022-07-12 PROCEDURE — 87081 CULTURE SCREEN ONLY: CPT

## 2022-07-12 PROCEDURE — 85025 COMPLETE CBC W/AUTO DIFF WBC: CPT | Performed by: FAMILY MEDICINE

## 2022-07-12 PROCEDURE — 85610 PROTHROMBIN TIME: CPT | Performed by: FAMILY MEDICINE

## 2022-07-12 PROCEDURE — 80053 COMPREHEN METABOLIC PANEL: CPT | Performed by: FAMILY MEDICINE

## 2022-07-12 PROCEDURE — 85730 THROMBOPLASTIN TIME PARTIAL: CPT | Performed by: FAMILY MEDICINE

## 2022-07-13 ENCOUNTER — LAB ENCOUNTER (OUTPATIENT)
Dept: LAB | Facility: HOSPITAL | Age: 77
End: 2022-07-13
Attending: FAMILY MEDICINE
Payer: MEDICARE

## 2022-07-13 DIAGNOSIS — Z01.818 PREOP TESTING: ICD-10-CM

## 2022-07-13 LAB
BILIRUB UR QL: NEGATIVE
COLOR UR: YELLOW
GLUCOSE UR-MCNC: NEGATIVE MG/DL
HGB UR QL STRIP.AUTO: NEGATIVE
KETONES UR-MCNC: NEGATIVE MG/DL
LEUKOCYTE ESTERASE UR QL STRIP.AUTO: NEGATIVE
NITRITE UR QL STRIP.AUTO: NEGATIVE
PH UR: 5 [PH] (ref 5–8)
PROT UR-MCNC: NEGATIVE MG/DL
SP GR UR STRIP: 1.02 (ref 1–1.03)
UROBILINOGEN UR STRIP-ACNC: <2
VIT C UR-MCNC: NEGATIVE MG/DL

## 2022-07-13 PROCEDURE — 87086 URINE CULTURE/COLONY COUNT: CPT | Performed by: FAMILY MEDICINE

## 2022-07-13 PROCEDURE — 81003 URINALYSIS AUTO W/O SCOPE: CPT

## 2022-07-26 ENCOUNTER — LAB ENCOUNTER (OUTPATIENT)
Dept: LAB | Facility: HOSPITAL | Age: 77
End: 2022-07-26
Attending: ORTHOPAEDIC SURGERY
Payer: MEDICARE

## 2022-07-26 ENCOUNTER — LAB ENCOUNTER (OUTPATIENT)
Dept: LAB | Facility: HOSPITAL | Age: 77
DRG: 470 | End: 2022-07-26
Attending: ORTHOPAEDIC SURGERY
Payer: MEDICARE

## 2022-07-26 DIAGNOSIS — Z01.818 PREOP TESTING: ICD-10-CM

## 2022-07-26 LAB
ANTIBODY SCREEN: NEGATIVE
RH BLOOD TYPE: NEGATIVE
RH BLOOD TYPE: NEGATIVE

## 2022-07-26 PROCEDURE — 86901 BLOOD TYPING SEROLOGIC RH(D): CPT

## 2022-07-26 PROCEDURE — 36415 COLL VENOUS BLD VENIPUNCTURE: CPT

## 2022-07-26 PROCEDURE — 86850 RBC ANTIBODY SCREEN: CPT

## 2022-07-26 PROCEDURE — 86900 BLOOD TYPING SEROLOGIC ABO: CPT

## 2022-07-27 LAB — SARS-COV-2 RNA RESP QL NAA+PROBE: NOT DETECTED

## 2022-07-28 ENCOUNTER — APPOINTMENT (OUTPATIENT)
Dept: GENERAL RADIOLOGY | Facility: HOSPITAL | Age: 77
DRG: 470 | End: 2022-07-28
Attending: ORTHOPAEDIC SURGERY
Payer: MEDICARE

## 2022-07-28 ENCOUNTER — ANESTHESIA (OUTPATIENT)
Dept: SURGERY | Facility: HOSPITAL | Age: 77
End: 2022-07-28
Payer: MEDICARE

## 2022-07-28 ENCOUNTER — HOSPITAL ENCOUNTER (INPATIENT)
Facility: HOSPITAL | Age: 77
LOS: 1 days | Discharge: HOME HEALTH | End: 2022-07-30
Attending: ORTHOPAEDIC SURGERY | Admitting: ORTHOPAEDIC SURGERY
Payer: MEDICARE

## 2022-07-28 ENCOUNTER — HOSPITAL ENCOUNTER (INPATIENT)
Facility: HOSPITAL | Age: 77
LOS: 1 days | Discharge: HOME HEALTH | DRG: 470 | End: 2022-07-30
Attending: ORTHOPAEDIC SURGERY | Admitting: ORTHOPAEDIC SURGERY
Payer: MEDICARE

## 2022-07-28 ENCOUNTER — ANESTHESIA EVENT (OUTPATIENT)
Dept: SURGERY | Facility: HOSPITAL | Age: 77
End: 2022-07-28
Payer: MEDICARE

## 2022-07-28 ENCOUNTER — APPOINTMENT (OUTPATIENT)
Dept: GENERAL RADIOLOGY | Facility: HOSPITAL | Age: 77
End: 2022-07-28
Attending: ORTHOPAEDIC SURGERY
Payer: MEDICARE

## 2022-07-28 DIAGNOSIS — Z01.818 PREOP TESTING: Primary | ICD-10-CM

## 2022-07-28 PROBLEM — E66.01 CLASS 2 SEVERE OBESITY DUE TO EXCESS CALORIES WITH SERIOUS COMORBIDITY AND BODY MASS INDEX (BMI) OF 36.0 TO 36.9 IN ADULT (HCC): Status: ACTIVE | Noted: 2022-07-28

## 2022-07-28 PROBLEM — M17.12 PRIMARY OSTEOARTHRITIS OF LEFT KNEE: Status: ACTIVE | Noted: 2022-07-28

## 2022-07-28 PROBLEM — E66.01 CLASS 2 SEVERE OBESITY DUE TO EXCESS CALORIES WITH SERIOUS COMORBIDITY AND BODY MASS INDEX (BMI) OF 36.0 TO 36.9 IN ADULT: Status: ACTIVE | Noted: 2022-07-28

## 2022-07-28 PROCEDURE — 73560 X-RAY EXAM OF KNEE 1 OR 2: CPT | Performed by: ORTHOPAEDIC SURGERY

## 2022-07-28 PROCEDURE — 99232 SBSQ HOSP IP/OBS MODERATE 35: CPT | Performed by: HOSPITALIST

## 2022-07-28 PROCEDURE — 0SRD0J9 REPLACEMENT OF LEFT KNEE JOINT WITH SYNTHETIC SUBSTITUTE, CEMENTED, OPEN APPROACH: ICD-10-PCS | Performed by: ORTHOPAEDIC SURGERY

## 2022-07-28 DEVICE — IMPLANTABLE DEVICE
Type: IMPLANTABLE DEVICE | Site: KNEE | Status: FUNCTIONAL
Brand: PERSONA™

## 2022-07-28 DEVICE — IMPLANTABLE DEVICE
Type: IMPLANTABLE DEVICE | Site: KNEE | Status: FUNCTIONAL
Brand: PERSONA® NATURAL TIBIA®

## 2022-07-28 DEVICE — IMPLANTABLE DEVICE
Type: IMPLANTABLE DEVICE | Site: KNEE | Status: FUNCTIONAL
Brand: PERSONA®

## 2022-07-28 DEVICE — IMPLANTABLE DEVICE
Type: IMPLANTABLE DEVICE | Site: KNEE | Status: FUNCTIONAL
Brand: REFOBACIN® BONE CEMENT R

## 2022-07-28 RX ORDER — METOCLOPRAMIDE HYDROCHLORIDE 5 MG/ML
10 INJECTION INTRAMUSCULAR; INTRAVENOUS EVERY 8 HOURS PRN
Status: DISCONTINUED | OUTPATIENT
Start: 2022-07-28 | End: 2022-07-30

## 2022-07-28 RX ORDER — SENNOSIDES 8.6 MG
17.2 TABLET ORAL NIGHTLY
Status: DISCONTINUED | OUTPATIENT
Start: 2022-07-28 | End: 2022-07-30

## 2022-07-28 RX ORDER — DIPHENHYDRAMINE HCL 25 MG
25 CAPSULE ORAL EVERY 4 HOURS PRN
Status: DISCONTINUED | OUTPATIENT
Start: 2022-07-28 | End: 2022-07-30

## 2022-07-28 RX ORDER — HYDROMORPHONE HYDROCHLORIDE 1 MG/ML
0.2 INJECTION, SOLUTION INTRAMUSCULAR; INTRAVENOUS; SUBCUTANEOUS EVERY 5 MIN PRN
Status: DISCONTINUED | OUTPATIENT
Start: 2022-07-28 | End: 2022-07-28 | Stop reason: HOSPADM

## 2022-07-28 RX ORDER — ACETAMINOPHEN 325 MG/1
650 TABLET ORAL EVERY 8 HOURS PRN
Status: DISCONTINUED | OUTPATIENT
Start: 2022-07-28 | End: 2022-07-30

## 2022-07-28 RX ORDER — AMLODIPINE BESYLATE AND BENAZEPRIL HYDROCHLORIDE 10; 20 MG/1; MG/1
1 CAPSULE ORAL DAILY
Status: DISCONTINUED | OUTPATIENT
Start: 2022-07-28 | End: 2022-07-28

## 2022-07-28 RX ORDER — FLUTICASONE FUROATE AND VILANTEROL 200; 25 UG/1; UG/1
1 POWDER RESPIRATORY (INHALATION) DAILY
Status: DISCONTINUED | OUTPATIENT
Start: 2022-07-28 | End: 2022-07-30

## 2022-07-28 RX ORDER — MORPHINE SULFATE 10 MG/ML
6 INJECTION, SOLUTION INTRAMUSCULAR; INTRAVENOUS EVERY 10 MIN PRN
Status: DISCONTINUED | OUTPATIENT
Start: 2022-07-28 | End: 2022-07-28 | Stop reason: HOSPADM

## 2022-07-28 RX ORDER — BISACODYL 10 MG
10 SUPPOSITORY, RECTAL RECTAL
Status: DISCONTINUED | OUTPATIENT
Start: 2022-07-28 | End: 2022-07-30

## 2022-07-28 RX ORDER — NALOXONE HYDROCHLORIDE 0.4 MG/ML
0.08 INJECTION, SOLUTION INTRAMUSCULAR; INTRAVENOUS; SUBCUTANEOUS
Status: ACTIVE | OUTPATIENT
Start: 2022-07-28 | End: 2022-07-29

## 2022-07-28 RX ORDER — DIPHENHYDRAMINE HYDROCHLORIDE 50 MG/ML
12.5 INJECTION INTRAMUSCULAR; INTRAVENOUS EVERY 4 HOURS PRN
Status: ACTIVE | OUTPATIENT
Start: 2022-07-28 | End: 2022-07-29

## 2022-07-28 RX ORDER — HALOPERIDOL 5 MG/ML
0.5 INJECTION INTRAMUSCULAR ONCE AS NEEDED
Status: ACTIVE | OUTPATIENT
Start: 2022-07-28 | End: 2022-07-28

## 2022-07-28 RX ORDER — VANCOMYCIN HYDROCHLORIDE
15 ONCE
Status: COMPLETED | OUTPATIENT
Start: 2022-07-28 | End: 2022-07-28

## 2022-07-28 RX ORDER — ACETAMINOPHEN 500 MG
1000 TABLET ORAL ONCE
Status: COMPLETED | OUTPATIENT
Start: 2022-07-28 | End: 2022-07-28

## 2022-07-28 RX ORDER — MORPHINE SULFATE 1 MG/ML
INJECTION, SOLUTION EPIDURAL; INTRATHECAL; INTRAVENOUS
Status: COMPLETED | OUTPATIENT
Start: 2022-07-28 | End: 2022-07-28

## 2022-07-28 RX ORDER — ACETAMINOPHEN 325 MG/1
650 TABLET ORAL EVERY 6 HOURS PRN
Status: ACTIVE | OUTPATIENT
Start: 2022-07-28 | End: 2022-07-29

## 2022-07-28 RX ORDER — HYDROMORPHONE HYDROCHLORIDE 1 MG/ML
0.6 INJECTION, SOLUTION INTRAMUSCULAR; INTRAVENOUS; SUBCUTANEOUS
Status: ACTIVE | OUTPATIENT
Start: 2022-07-28 | End: 2022-07-29

## 2022-07-28 RX ORDER — PROCHLORPERAZINE EDISYLATE 5 MG/ML
5 INJECTION INTRAMUSCULAR; INTRAVENOUS ONCE AS NEEDED
Status: COMPLETED | OUTPATIENT
Start: 2022-07-28 | End: 2022-07-28

## 2022-07-28 RX ORDER — HYDROMORPHONE HYDROCHLORIDE 1 MG/ML
0.6 INJECTION, SOLUTION INTRAMUSCULAR; INTRAVENOUS; SUBCUTANEOUS EVERY 5 MIN PRN
Status: DISCONTINUED | OUTPATIENT
Start: 2022-07-28 | End: 2022-07-28 | Stop reason: HOSPADM

## 2022-07-28 RX ORDER — SODIUM CHLORIDE, SODIUM LACTATE, POTASSIUM CHLORIDE, CALCIUM CHLORIDE 600; 310; 30; 20 MG/100ML; MG/100ML; MG/100ML; MG/100ML
INJECTION, SOLUTION INTRAVENOUS CONTINUOUS
Status: DISCONTINUED | OUTPATIENT
Start: 2022-07-28 | End: 2022-07-28

## 2022-07-28 RX ORDER — ASPIRIN 325 MG
325 TABLET, DELAYED RELEASE (ENTERIC COATED) ORAL
Status: DISCONTINUED | OUTPATIENT
Start: 2022-07-28 | End: 2022-07-30

## 2022-07-28 RX ORDER — POLYETHYLENE GLYCOL 3350 17 G/17G
17 POWDER, FOR SOLUTION ORAL DAILY PRN
Status: DISCONTINUED | OUTPATIENT
Start: 2022-07-28 | End: 2022-07-30

## 2022-07-28 RX ORDER — METOCLOPRAMIDE HYDROCHLORIDE 5 MG/ML
10 INJECTION INTRAMUSCULAR; INTRAVENOUS EVERY 8 HOURS PRN
Status: DISCONTINUED | OUTPATIENT
Start: 2022-07-28 | End: 2022-07-28 | Stop reason: HOSPADM

## 2022-07-28 RX ORDER — DOCUSATE SODIUM 100 MG/1
100 CAPSULE, LIQUID FILLED ORAL 2 TIMES DAILY
Status: DISCONTINUED | OUTPATIENT
Start: 2022-07-28 | End: 2022-07-30

## 2022-07-28 RX ORDER — VANCOMYCIN/0.9 % SOD CHLORIDE 1.75 G/5
15 PLASTIC BAG, INJECTION (ML) INTRAVENOUS ONCE
Status: COMPLETED | OUTPATIENT
Start: 2022-07-28 | End: 2022-07-28

## 2022-07-28 RX ORDER — METOCLOPRAMIDE 10 MG/1
10 TABLET ORAL ONCE
Status: COMPLETED | OUTPATIENT
Start: 2022-07-28 | End: 2022-07-28

## 2022-07-28 RX ORDER — HYDROMORPHONE HYDROCHLORIDE 1 MG/ML
0.4 INJECTION, SOLUTION INTRAMUSCULAR; INTRAVENOUS; SUBCUTANEOUS
Status: DISPENSED | OUTPATIENT
Start: 2022-07-28 | End: 2022-07-29

## 2022-07-28 RX ORDER — CALCIUM CARBONATE/VITAMIN D3 250-3.125
1 TABLET ORAL 2 TIMES DAILY WITH MEALS
Status: DISCONTINUED | OUTPATIENT
Start: 2022-07-28 | End: 2022-07-30

## 2022-07-28 RX ORDER — HYDROCODONE BITARTRATE AND ACETAMINOPHEN 5; 325 MG/1; MG/1
2 TABLET ORAL ONCE AS NEEDED
Status: DISCONTINUED | OUTPATIENT
Start: 2022-07-28 | End: 2022-07-28 | Stop reason: HOSPADM

## 2022-07-28 RX ORDER — MORPHINE SULFATE 4 MG/ML
2 INJECTION, SOLUTION INTRAMUSCULAR; INTRAVENOUS EVERY 10 MIN PRN
Status: DISCONTINUED | OUTPATIENT
Start: 2022-07-28 | End: 2022-07-28 | Stop reason: HOSPADM

## 2022-07-28 RX ORDER — TRANEXAMIC ACID 10 MG/ML
INJECTION, SOLUTION INTRAVENOUS AS NEEDED
Status: DISCONTINUED | OUTPATIENT
Start: 2022-07-28 | End: 2022-07-28 | Stop reason: SURG

## 2022-07-28 RX ORDER — DIPHENHYDRAMINE HYDROCHLORIDE 50 MG/ML
12.5 INJECTION INTRAMUSCULAR; INTRAVENOUS EVERY 4 HOURS PRN
Status: DISCONTINUED | OUTPATIENT
Start: 2022-07-28 | End: 2022-07-30

## 2022-07-28 RX ORDER — ONDANSETRON 2 MG/ML
4 INJECTION INTRAMUSCULAR; INTRAVENOUS ONCE AS NEEDED
Status: ACTIVE | OUTPATIENT
Start: 2022-07-28 | End: 2022-07-28

## 2022-07-28 RX ORDER — SODIUM CHLORIDE, SODIUM LACTATE, POTASSIUM CHLORIDE, CALCIUM CHLORIDE 600; 310; 30; 20 MG/100ML; MG/100ML; MG/100ML; MG/100ML
INJECTION, SOLUTION INTRAVENOUS CONTINUOUS
Status: DISCONTINUED | OUTPATIENT
Start: 2022-07-28 | End: 2022-07-28 | Stop reason: HOSPADM

## 2022-07-28 RX ORDER — MULTIPLE VITAMINS W/ MINERALS TAB 9MG-400MCG
1 TAB ORAL DAILY
Status: DISCONTINUED | OUTPATIENT
Start: 2022-07-28 | End: 2022-07-30

## 2022-07-28 RX ORDER — DIPHENHYDRAMINE HCL 25 MG
25 CAPSULE ORAL EVERY 4 HOURS PRN
Status: ACTIVE | OUTPATIENT
Start: 2022-07-28 | End: 2022-07-29

## 2022-07-28 RX ORDER — ONDANSETRON 2 MG/ML
4 INJECTION INTRAMUSCULAR; INTRAVENOUS EVERY 6 HOURS PRN
Status: DISCONTINUED | OUTPATIENT
Start: 2022-07-28 | End: 2022-07-28 | Stop reason: HOSPADM

## 2022-07-28 RX ORDER — SODIUM CHLORIDE 9 MG/ML
INJECTION, SOLUTION INTRAVENOUS CONTINUOUS
Status: DISCONTINUED | OUTPATIENT
Start: 2022-07-28 | End: 2022-07-30

## 2022-07-28 RX ORDER — ONDANSETRON 2 MG/ML
INJECTION INTRAMUSCULAR; INTRAVENOUS AS NEEDED
Status: DISCONTINUED | OUTPATIENT
Start: 2022-07-28 | End: 2022-07-28 | Stop reason: SURG

## 2022-07-28 RX ORDER — DIPHENHYDRAMINE HYDROCHLORIDE 50 MG/ML
25 INJECTION INTRAMUSCULAR; INTRAVENOUS ONCE AS NEEDED
Status: ACTIVE | OUTPATIENT
Start: 2022-07-28 | End: 2022-07-28

## 2022-07-28 RX ORDER — KETOROLAC TROMETHAMINE 30 MG/ML
INJECTION, SOLUTION INTRAMUSCULAR; INTRAVENOUS AS NEEDED
Status: DISCONTINUED | OUTPATIENT
Start: 2022-07-28 | End: 2022-07-28 | Stop reason: SURG

## 2022-07-28 RX ORDER — NAPROXEN 500 MG/1
500 TABLET ORAL 2 TIMES DAILY WITH MEALS
Status: DISCONTINUED | OUTPATIENT
Start: 2022-07-28 | End: 2022-07-30

## 2022-07-28 RX ORDER — MUPIROCIN CALCIUM 20 MG/G
1 CREAM TOPICAL 3 TIMES DAILY
Status: DISCONTINUED | OUTPATIENT
Start: 2022-07-28 | End: 2022-07-28

## 2022-07-28 RX ORDER — MORPHINE SULFATE 4 MG/ML
4 INJECTION, SOLUTION INTRAMUSCULAR; INTRAVENOUS EVERY 10 MIN PRN
Status: DISCONTINUED | OUTPATIENT
Start: 2022-07-28 | End: 2022-07-28 | Stop reason: HOSPADM

## 2022-07-28 RX ORDER — ACETAMINOPHEN 500 MG
1000 TABLET ORAL ONCE AS NEEDED
Status: DISCONTINUED | OUTPATIENT
Start: 2022-07-28 | End: 2022-07-28 | Stop reason: HOSPADM

## 2022-07-28 RX ORDER — HYDROCODONE BITARTRATE AND ACETAMINOPHEN 10; 325 MG/1; MG/1
1 TABLET ORAL EVERY 4 HOURS PRN
Status: DISCONTINUED | OUTPATIENT
Start: 2022-07-28 | End: 2022-07-29

## 2022-07-28 RX ORDER — BUPIVACAINE HYDROCHLORIDE 7.5 MG/ML
INJECTION, SOLUTION INTRASPINAL
Status: COMPLETED | OUTPATIENT
Start: 2022-07-28 | End: 2022-07-28

## 2022-07-28 RX ORDER — NALBUPHINE HCL 10 MG/ML
2.5 AMPUL (ML) INJECTION EVERY 4 HOURS PRN
Status: ACTIVE | OUTPATIENT
Start: 2022-07-28 | End: 2022-07-29

## 2022-07-28 RX ORDER — FAMOTIDINE 20 MG/1
20 TABLET, FILM COATED ORAL ONCE
Status: COMPLETED | OUTPATIENT
Start: 2022-07-28 | End: 2022-07-28

## 2022-07-28 RX ORDER — HYDROCODONE BITARTRATE AND ACETAMINOPHEN 7.5; 325 MG/1; MG/1
1 TABLET ORAL EVERY 6 HOURS PRN
Status: DISPENSED | OUTPATIENT
Start: 2022-07-28 | End: 2022-07-29

## 2022-07-28 RX ORDER — HYDROCODONE BITARTRATE AND ACETAMINOPHEN 7.5; 325 MG/1; MG/1
2 TABLET ORAL EVERY 6 HOURS PRN
Status: ACTIVE | OUTPATIENT
Start: 2022-07-28 | End: 2022-07-29

## 2022-07-28 RX ORDER — EPINEPHRINE 0.3 MG/.3ML
0.3 INJECTION SUBCUTANEOUS 4 TIMES DAILY PRN
Status: DISCONTINUED | OUTPATIENT
Start: 2022-07-28 | End: 2022-07-28

## 2022-07-28 RX ORDER — SODIUM PHOSPHATE, DIBASIC AND SODIUM PHOSPHATE, MONOBASIC 7; 19 G/133ML; G/133ML
1 ENEMA RECTAL ONCE AS NEEDED
Status: DISCONTINUED | OUTPATIENT
Start: 2022-07-28 | End: 2022-07-30

## 2022-07-28 RX ORDER — HYDROCODONE BITARTRATE AND ACETAMINOPHEN 5; 325 MG/1; MG/1
1 TABLET ORAL ONCE AS NEEDED
Status: DISCONTINUED | OUTPATIENT
Start: 2022-07-28 | End: 2022-07-28 | Stop reason: HOSPADM

## 2022-07-28 RX ORDER — ONDANSETRON 2 MG/ML
4 INJECTION INTRAMUSCULAR; INTRAVENOUS EVERY 6 HOURS PRN
Status: DISCONTINUED | OUTPATIENT
Start: 2022-07-28 | End: 2022-07-30

## 2022-07-28 RX ORDER — ENOXAPARIN SODIUM 100 MG/ML
40 INJECTION SUBCUTANEOUS ONCE
Status: COMPLETED | OUTPATIENT
Start: 2022-07-28 | End: 2022-07-28

## 2022-07-28 RX ORDER — ALBUTEROL SULFATE 90 UG/1
2 AEROSOL, METERED RESPIRATORY (INHALATION) EVERY 6 HOURS PRN
Status: DISCONTINUED | OUTPATIENT
Start: 2022-07-28 | End: 2022-07-30

## 2022-07-28 RX ORDER — NALOXONE HYDROCHLORIDE 0.4 MG/ML
80 INJECTION, SOLUTION INTRAMUSCULAR; INTRAVENOUS; SUBCUTANEOUS AS NEEDED
Status: DISCONTINUED | OUTPATIENT
Start: 2022-07-28 | End: 2022-07-28 | Stop reason: HOSPADM

## 2022-07-28 RX ORDER — HYDROMORPHONE HYDROCHLORIDE 1 MG/ML
0.4 INJECTION, SOLUTION INTRAMUSCULAR; INTRAVENOUS; SUBCUTANEOUS EVERY 5 MIN PRN
Status: DISCONTINUED | OUTPATIENT
Start: 2022-07-28 | End: 2022-07-28 | Stop reason: HOSPADM

## 2022-07-28 RX ADMIN — BUPIVACAINE HYDROCHLORIDE 1.8 ML: 7.5 INJECTION, SOLUTION INTRASPINAL at 07:53:00

## 2022-07-28 RX ADMIN — ONDANSETRON 4 MG: 2 INJECTION INTRAMUSCULAR; INTRAVENOUS at 07:38:00

## 2022-07-28 RX ADMIN — TRANEXAMIC ACID 1000 MG: 10 INJECTION, SOLUTION INTRAVENOUS at 08:15:00

## 2022-07-28 RX ADMIN — TRANEXAMIC ACID 1000 MG: 10 INJECTION, SOLUTION INTRAVENOUS at 09:23:00

## 2022-07-28 RX ADMIN — MORPHINE SULFATE 0.2 MG: 1 INJECTION, SOLUTION EPIDURAL; INTRATHECAL; INTRAVENOUS at 07:53:00

## 2022-07-28 RX ADMIN — KETOROLAC TROMETHAMINE 15 MG: 30 INJECTION, SOLUTION INTRAMUSCULAR; INTRAVENOUS at 09:26:00

## 2022-07-28 RX ADMIN — SODIUM CHLORIDE, SODIUM LACTATE, POTASSIUM CHLORIDE, CALCIUM CHLORIDE: 600; 310; 30; 20 INJECTION, SOLUTION INTRAVENOUS at 10:06:00

## 2022-07-28 NOTE — CM/SW NOTE
Department  notified of request for [facility], aidin referrals started. Assigned CM/SW to follow up with pt/family on further discharge planning.      Tiago Hull  [facility]

## 2022-07-28 NOTE — ANESTHESIA PROCEDURE NOTES
Spinal Block    Date/Time: 7/28/2022 7:53 AM  Performed by: Duane Lees CRNA  Authorized by: Kenzie Moore MD       General Information and Staff    Start Time:  7/28/2022 7:38 AM  End Time:  7/28/2022 7:53 AM  Anesthesiologist:  Kenzie Moore MD  CRNA:  Duane Lees CRNA  Performed by:  CRNA  Preanesthetic Checklist: patient identified, IV checked, risks and benefits discussed, monitors and equipment checked, pre-op evaluation, timeout performed, anesthesia consent and sterile technique used      Procedure Details    Patient Position:  Sitting  Prep: ChloraPrep    Monitoring:  Cardiac monitor  Approach:  Right paramedian  Location:  L3-4  Injection Technique:  Single-shot    Needle    Needle Type:  Pencil-tip  Needle Gauge:  24 G  Needle Length:  3.5 in    Assessment    Sensory Level:   Events: clear CSF, CSF aspirated, well tolerated and blood negative     Additional Comments

## 2022-07-28 NOTE — HOME CARE LIAISON
Patient provided with list of Lisa Juany providers from [facility], patient choice is [address]. Agency reserved in [facility] and contact information placed on AVS. Financial interest disclosure provided to patient. NAIN Bridges. Updated.

## 2022-07-28 NOTE — CM/SW NOTE
CM received MDO for dc planning. Pt is outpt in a bed status in 1E. CM spoke to [employer] whom states that pts pre-op dc plan is to return home with [facility]. [facility] referrals sent via [employer]. F2F entered. [facility] able to accept pt and will provide choice. Plan: Home with [facility] when stable. / to remain available for support and/or discharge planning. Aaron Gonzalez.  Marin France RN, BSN  Nurse   [phone]

## 2022-07-28 NOTE — INTERVAL H&P NOTE
Pre-op Diagnosis: left knee osteoarthritis    The above referenced H&P was reviewed by Jose Manuel Flores MD on 7/28/2022, the patient was examined and no significant changes have occurred in the patient's condition since the H&P was performed. I discussed with the patient and/or legal representative the potential benefits, risks and side effects of this procedure; the likelihood of the patient achieving goals; and potential problems that might occur during recuperation. I discussed reasonable alternatives to the procedure, including risks, benefits and side effects related to the alternatives and risks related to not receiving this procedure. We will proceed with procedure as planned.

## 2022-07-28 NOTE — BRIEF OP NOTE
Pre-Operative Diagnosis: 1) left knee osteoarthritis, 2) obesity (BMI 36.64)     Post-Operative Diagnosis: Same     Procedure Performed:   left total knee arthroplasty with MRI templated patient specific instruments    Surgeon(s) and Role: Miller Paget, MD - Primary    Assistant(s): Kemal Martínez PA-C (first assistant); EZRA Gomes (second assistant)    Anesthesia: Dr. Giacomo Walton with spinal using Duramorph/MAC     Surgical Findings: Haroon persona LPS femur 10 left standard, patella 41 x 10.0 mm, tibia F left with short stem extension, polyethylene 13 mm LPS Vivacit-E. Tourniquet 61 minutes at 300 mmHg. Drain: None  Specimen: Bone cuts to pathology   Complications: None  Estimated Blood Loss: 20 cc  Stable to PACU. Tranexamic acid 1 g IV second dose given in OR at end of case.     Padmaja Ledesma MD  7/28/2022  8:06 AM

## 2022-07-29 PROBLEM — Z01.818 PREOP TESTING: Status: ACTIVE | Noted: 2022-07-29

## 2022-07-29 LAB
ANION GAP SERPL CALC-SCNC: 7 MMOL/L (ref 0–18)
ANION GAP SERPL CALC-SCNC: 7 MMOL/L (ref 0–18)
BUN BLD-MCNC: 13 MG/DL (ref 7–18)
BUN BLD-MCNC: 15 MG/DL (ref 7–18)
BUN/CREAT SERPL: 16.9 (ref 10–20)
BUN/CREAT SERPL: 22.4 (ref 10–20)
CALCIUM BLD-MCNC: 8.5 MG/DL (ref 8.5–10.1)
CALCIUM BLD-MCNC: 8.9 MG/DL (ref 8.5–10.1)
CHLORIDE SERPL-SCNC: 101 MMOL/L (ref 98–112)
CHLORIDE SERPL-SCNC: 99 MMOL/L (ref 98–112)
CO2 SERPL-SCNC: 24 MMOL/L (ref 21–32)
CO2 SERPL-SCNC: 25 MMOL/L (ref 21–32)
CREAT BLD-MCNC: 0.67 MG/DL
CREAT BLD-MCNC: 0.77 MG/DL
CREAT UR-SCNC: 160 MG/DL
GLUCOSE BLD-MCNC: 124 MG/DL (ref 70–99)
GLUCOSE BLD-MCNC: 125 MG/DL (ref 70–99)
HCT VFR BLD AUTO: 33.6 %
HGB BLD-MCNC: 11 G/DL
IRON SATN MFR SERPL: 11 %
IRON SERPL-MCNC: 29 UG/DL
OSMOLALITY SERPL CALC.SUM OF ELEC: 274 MOSM/KG (ref 275–295)
OSMOLALITY SERPL CALC.SUM OF ELEC: 276 MOSM/KG (ref 275–295)
OSMOLALITY UR: 687 MOSM/KG (ref 300–1100)
POTASSIUM SERPL-SCNC: 4.3 MMOL/L (ref 3.5–5.1)
POTASSIUM SERPL-SCNC: 4.4 MMOL/L (ref 3.5–5.1)
SODIUM SERPL-SCNC: 131 MMOL/L (ref 136–145)
SODIUM SERPL-SCNC: 132 MMOL/L (ref 136–145)
SODIUM SERPL-SCNC: 29 MMOL/L
TIBC SERPL-MCNC: 271 UG/DL (ref 240–450)
TRANSFERRIN SERPL-MCNC: 182 MG/DL (ref 200–360)

## 2022-07-29 PROCEDURE — 99233 SBSQ HOSP IP/OBS HIGH 50: CPT | Performed by: HOSPITALIST

## 2022-07-29 RX ORDER — PSEUDOEPHEDRINE HCL 30 MG
100 TABLET ORAL 2 TIMES DAILY
Qty: 20 CAPSULE | Refills: 0 | Status: SHIPPED | OUTPATIENT
Start: 2022-07-29

## 2022-07-29 RX ORDER — NAPROXEN 500 MG/1
500 TABLET ORAL 2 TIMES DAILY WITH MEALS
Qty: 40 TABLET | Refills: 0 | Status: SHIPPED | OUTPATIENT
Start: 2022-07-29 | End: 2022-07-30

## 2022-07-29 RX ORDER — CALCIUM CARBONATE/VITAMIN D3 250-3.125
1 TABLET ORAL 2 TIMES DAILY WITH MEALS
Qty: 60 TABLET | Refills: 0 | Status: SHIPPED | OUTPATIENT
Start: 2022-07-29

## 2022-07-29 RX ORDER — HYDROCODONE BITARTRATE AND ACETAMINOPHEN 7.5; 325 MG/1; MG/1
1 TABLET ORAL EVERY 4 HOURS PRN
Status: DISCONTINUED | OUTPATIENT
Start: 2022-07-29 | End: 2022-07-30

## 2022-07-29 RX ORDER — PANTOPRAZOLE SODIUM 40 MG/1
40 TABLET, DELAYED RELEASE ORAL
Status: DISCONTINUED | OUTPATIENT
Start: 2022-07-29 | End: 2022-07-30

## 2022-07-29 RX ORDER — CALCIUM CARBONATE 200(500)MG
500 TABLET,CHEWABLE ORAL 3 TIMES DAILY PRN
Status: DISCONTINUED | OUTPATIENT
Start: 2022-07-29 | End: 2022-07-30

## 2022-07-29 RX ORDER — HYDROCODONE BITARTRATE AND ACETAMINOPHEN 7.5; 325 MG/1; MG/1
1 TABLET ORAL EVERY 6 HOURS PRN
Status: DISCONTINUED | OUTPATIENT
Start: 2022-07-29 | End: 2022-07-29

## 2022-07-29 RX ORDER — HYDROCODONE BITARTRATE AND ACETAMINOPHEN 7.5; 325 MG/1; MG/1
1 TABLET ORAL EVERY 6 HOURS PRN
Qty: 25 TABLET | Refills: 0 | Status: SHIPPED | OUTPATIENT
Start: 2022-07-29

## 2022-07-29 RX ORDER — ACETAMINOPHEN 325 MG/1
650 TABLET ORAL EVERY 8 HOURS PRN
Qty: 60 TABLET | Refills: 0 | Status: SHIPPED | OUTPATIENT
Start: 2022-07-29

## 2022-07-29 NOTE — PLAN OF CARE
Assumed care of patient this afternoon. No change in CMS noted. Dressing change completed by Veronica BURRELL from ortho. Tums for upset stomach. Norco for pain. IV fluids infusing as prescribed. Declined Naproxen due to upset stomach. Able to verbalize needs, call light in reach.

## 2022-07-29 NOTE — PLAN OF CARE
Julien Mon is post op day #1- she is S/P left knee. She is alert and ox4, neuro checks intact. Dressing to left knee ace wrap, dry and intact w gel ice packs. Right arm precautions maintained- B/P checks to left arm. Medicated w zofran for nausea- resolved- pain managed with one norco 7.5 tab- cullen diet, voiding, Cullen therapy fairly. Planning home w [facility] tomorrow. SCD's x2, Asprin 325 mg BID, held scheduled naproxen- patient felt it contributed to her nausea. Addendum- report to [facility]- will order transport for patient when room is cleaned    Problem: PAIN - ADULT  Goal: Verbalizes/displays adequate comfort level or patient's stated pain goal  Description: INTERVENTIONS:  - Encourage pt to monitor pain and request assistance  - Assess pain using appropriate pain scale  - Administer analgesics based on type and severity of pain and evaluate response  - Implement non-pharmacological measures as appropriate and evaluate response  - Consider cultural and social influences on pain and pain management  - Manage/alleviate anxiety  - Utilize distraction and/or relaxation techniques  - Monitor for opioid side effects  - Notify MD/LIP if interventions unsuccessful or patient reports new pain  - Anticipate increased pain with activity and pre-medicate as appropriate  Outcome: Progressing     Problem: RISK FOR INFECTION - ADULT  Goal: Absence of fever/infection during anticipated neutropenic period  Description: INTERVENTIONS  - Monitor WBC  - Administer growth factors as ordered  - Implement neutropenic guidelines  Outcome: Progressing     Problem: SAFETY ADULT - FALL  Goal: Free from fall injury  Description: INTERVENTIONS:  - Assess pt frequently for physical needs  - Identify cognitive and physical deficits and behaviors that affect risk of falls.   - [place] fall precautions as indicated by assessment.  - Educate pt/family on patient safety including physical limitations  - Instruct pt to call for assistance with activity based on assessment  - Modify environment to reduce risk of injury  - Provide assistive devices as appropriate  - Consider OT/PT consult to assist with strengthening/mobility  - Encourage toileting schedule  Outcome: Progressing     Problem: DISCHARGE PLANNING  Goal: Discharge to home or other facility with appropriate resources  Description: INTERVENTIONS:  - Identify barriers to discharge w/pt and caregiver  - Include patient/family/discharge partner in discharge planning  - Arrange for needed discharge resources and transportation as appropriate  - Identify discharge learning needs (meds, wound care, etc)  - Arrange for interpreters to assist at discharge as needed  - Consider post-discharge preferences of patient/family/discharge partner  - Complete POLST form as appropriate  - Assess patient's ability to be responsible for managing their own health  - Refer to Case Management Department for coordinating discharge planning if the patient needs post-hospital services based on physician/LIP order or complex needs related to functional status, cognitive ability or social support system  Outcome: Progressing

## 2022-07-29 NOTE — PHYSICAL THERAPY NOTE
PHYSICAL THERAPY KNEE TREATMENT NOTE - INPATIENT     Room Number: Room 6/Room 6-A             Presenting Problem: OA left knee , pt is s/p left TKA. Pt is WBAT for left LE post sx. Problem List  Principal Problem:    Primary osteoarthritis of left knee  Active Problems:    Hypertension    Mild intermittent asthma      PHYSICAL THERAPY ASSESSMENT     Per RN request,pt seen BID. Min a for bed mobility and transfer. EOB sitting balance activity with emphasis on core stabilization. Pt educated on deep breathing,relaxation as well as energy conservation technique;extra time provided to complete task due to pt c/o nausea and dizziness. Pt amb 2 x40 ft with RW and min a;slow maame. Provided cuing for gait pattern as well as for postural awareness. Ther ex. Colaborated with MD regarding pt discharge;pt will be beneficial with cont PT RX. Pt ended session sitting up in chair;call light within reach. RN aware. The patient's Approx Degree of Impairment: 50.57% has been calculated based on documentation in the [facility] '6 clicks' Inpatient Basic Mobility Short Form. Research supports that patients with this level of impairment may benefit from 24 hour care/supervision. DISCHARGE RECOMMENDATIONS  PT Discharge Recommendations: 24 hour care/supervision    PLAN  PT Treatment Plan: Bed mobility; Endurance;Gait training  Frequency (Obs): Daily      SUBJECTIVE  Pt reports being ready for PT RX    OBJECTIVE  Precautions: Limb alert - left    WEIGHT BEARING STATUS           L Lower Extremity: Weight Bearing as Tolerated    PAIN ASSESSMENT   Ratin          BALANCE  Static Sitting: Fair +  Dynamic Sitting: Fair +  Static Standing: Fair -  Dynamic Standing: Fair -    ACTIVITY TOLERANCE                         O2 WALK       AM-PAC '6-Clicks' INPATIENT SHORT FORM - BASIC MOBILITY  How much difficulty does the patient currently have. ..   Patient Difficulty: Turning over in bed (including adjusting bedclothes, sheets and blankets)?: A Little   Patient Difficulty: Sitting down on and standing up from a chair with arms (e.g., wheelchair, bedside commode, etc.): A Little   Patient Difficulty: Moving from lying on back to sitting on the side of the bed?: A Little   How much help from another person does the patient currently need. .. Help from Another: Moving to and from a bed to a chair (including a wheelchair)?: A Little   Help from Another: Need to walk in hospital room?: A Little   Help from Another: Climbing 3-5 steps with a railing?: A Lot     AM-PAC Score:  Raw Score: 17   Approx Degree of Impairment: 50.57%   Standardized Score (AM-PAC Scale): 42.13   CMS Modifier (G-Code): CK    FUNCTIONAL ABILITY STATUS      Additional Information:     Exercises AM Session PM Session   Ankle Pumps 20 reps 20 reps   Quad Sets 20 reps 20 reps   Glut Sets 20 reps 20 reps     Comments: Pt participated in group session, tolerance was     Knee ROM                 Patient End of Session: Up in chair;Call light within reach;RN aware of session/findings;Bracing education provided per handout; All patient questions and concerns addressed    CURRENT GOALS    Patient Goal Patient's self-stated goal is: home tomorrow    Goal #1 Patient is able to demonstrate supine - sit EOB @ level: supervision     Goal #1   Current Status Min a   Goal #2 Patient is able to demonstrate transfers EOB to/from Chair/Wheelchair at assistance level: supervision with walker - rolling     Goal #2  Current Status Min a   Goal #3 Patient is able to ambulate 300  feet with assist device: walker - rolling at assistance level: supervision   Goal #3   Current Status Pt amb 2 x 40 ft with RW and min a   Goal #4 Patient will negotiate 1-4  stairs/one curb for community re-entry prn --pt has no stairs  w/ assistive device and min support    Goal #4   Current Status Pt refused   Goal #5 Patient to demonstrate independence with home activity/exercise instructions provided to patient in preparation for discharge.    Goal #5   Current Status In progeress   Goal #6 Left knee ROM 0-90 degree with respect for pain    Goal #6  Current Status In progress

## 2022-07-29 NOTE — OPERATIVE REPORT
[facility]    PATIENT'S NAME: Paige Abbott   ATTENDING PHYSICIAN: [address] MIRZA Alaniz MD   OPERATING PHYSICIAN: Sun Alaniz MD   PATIENT ACCOUNT#:   [id number]    LOCATION:   ROOM 6 A [facility]  MEDICAL RECORD #:   [id number]       YOB: 1945  ADMISSION DATE:       07/28/2022      OPERATION DATE:  07/28/2022    OPERATIVE REPORT      PREOPERATIVE DIAGNOSIS:    1. Left knee osteoarthritis. 2.   Obesity (BMI 36.64). POSTOPERATIVE DIAGNOSIS:    1. Left knee osteoarthritis. 2.   Obesity (BMI 36.64). PROCEDURE:  Left total knee arthroplasty with MRI-templated patient-specific instruments. ASSISTANTS:    1. ASHANTI Kim   2. EZRA Solis     ANESTHESIA:  Dr. Bella Ray, with spinal using Duramorph/MAC. INDICATIONS:  Patient is a 81-year-old woman with the above diagnoses documented by physical exam, x-ray, and MRI. She has failed nonoperative treatment, and the risks and complications of surgery were discussed. No guarantees were given. The consent was signed. Due to her obesity code with a BMI of 36.64, 2 surgical assistants were medically necessary. They provided safe transfer to the operative table, help with positioning her just the prior to prepping the lower extremity. During the case, they provided the necessary retraction for safe implantation of the components. This was their most important function. They then provided superficial closure, dressings, and safe transfer to the recovery room. In this particular case, without 2 surgical assistants, due to the obesity code, they were both medically necessary. OPERATIVE TECHNIQUE:  Patient was brought to the operating room and placed in supine position. Appropriate IV access and monitors were placed. Vancomycin and tranexamic acid 1 g IV were both given as a prophylaxis at the appropriate interval.  Spinal Duramorph was done by Dr. Bella Ray, and the patient was placed under monitored sedation.   SCD boot was on the right leg and a tourniquet placed on the upper left thigh. The left lower extremity was then prepped and draped in a sterile fashion with Betadine, followed by ChloraPrep. The leg was exsanguinated and the tourniquet inflated to 300 mmHg. Tourniquet time for the case was 71 minutes. An incision was made through the previous incision from a distant patella fracture surgery with subsequent hardware removal.  A midvastus snip was used to gain access to the left knee. The patella was resected from 26 mm to 15 mm. A 41 mm patella fit optimally and the 3 drill holes placed. A protector was placed and the patella tucked in the lateral gutter. The knee was flexed, and the anterior menisci and cruciate ligaments were both excised. The patient-specific guide was applied to the distal femur and fit well. The pins were placed, and the distal femoral cuts were made as templated. The knee was flexed further, and the size 10 cutting block was applied and seen to be properly externally rotated. The anterior cuts were without notch. The posterior cuts were made as templated. The chamfer cuts were finished, and the medial and lateral osteophytes were removed. The rest of the menisci were excised, and the patient-specific guide was applied to the proximal tibia. The drop ely was in line with the anterior tibial spine with proper posterior slope. The cuts were made as templated. The size F tibia fit well. The box was cut for the femur, and the trial components were put into position. With a 12 or 13 mm spacer, there was full extension and full deep flexion with proper rollback and no liftoff. The collateral ligaments were balanced in 0 degrees, 40 degrees, and 100 degrees. The patella was tracking well. The tibia was nailed in its proper rotation, the pegs were drilled for the femur, and the stem prepared for the tibia.   The bone surfaces were cleansed thoroughly with saline using Pulsavac lavage and then dried thoroughly. Two batches of Biomet methylmethacrylate cement with gentamicin were mixed in a vacuum container. The cement was pressurized into the bone surfaces, as well as on the backside of the components. The tibia, followed by femur, followed by patella were cemented in standard fashion. A spacer was placed, and an assistant held axial pressure while the cement hardened, and excess cement was removed. During the last 4 minutes of cement drying, the knee had been filled with a dilute Betadine solution. Once the cement had dried, it was inspected for excess cement. Trial reduction confirmed the 13 mm fit better than the 12 mm. Again, there was excellent motion, balance, rollback, and tracking. The 13 mm LPS Vivacit-E polyethylene was then snapped into position, and again, the above parameters were noted. The tourniquet was let down and the knee closed in flexion in layers. Once the capsule was closed, the knee was injected with 90 mL of joint cocktail for postop pain relief. Skin closure was accomplished with Monocryl and Exofin glue. Sterile dressings were applied, followed by cotton with ice packs and Ace wrap. She was taken out of sedation and brought to the recovery room in stable condition. Tranexamic acid 1 g IV second dose was given in the OR at the end of the case after the tourniquet was let down. Blood loss was 20 mL. The specimens were the bone cuts to Pathology. There were no drains and no complications, and patient tolerated the procedure well. Dictated By Nemo Driscoll. Ovidio Pickett MD  d: 07/28/2022 13:41:25  t: 07/28/2022 23:39:08  Job [id number]  NQR/    cc: Marjan Leos.  Lilibeth Farias MD

## 2022-07-30 VITALS
SYSTOLIC BLOOD PRESSURE: 110 MMHG | DIASTOLIC BLOOD PRESSURE: 68 MMHG | RESPIRATION RATE: 16 BRPM | HEIGHT: 68 IN | TEMPERATURE: 98 F | OXYGEN SATURATION: 92 % | HEART RATE: 96 BPM | BODY MASS INDEX: 36.53 KG/M2 | WEIGHT: 241 LBS

## 2022-07-30 LAB
ANION GAP SERPL CALC-SCNC: 6 MMOL/L (ref 0–18)
BUN BLD-MCNC: 10 MG/DL (ref 7–18)
BUN/CREAT SERPL: 19.2 (ref 10–20)
CALCIUM BLD-MCNC: 7.9 MG/DL (ref 8.5–10.1)
CHLORIDE SERPL-SCNC: 103 MMOL/L (ref 98–112)
CO2 SERPL-SCNC: 24 MMOL/L (ref 21–32)
CREAT BLD-MCNC: 0.52 MG/DL
DEPRECATED RDW RBC AUTO: 42.5 FL (ref 35.1–46.3)
ERYTHROCYTE [DISTWIDTH] IN BLOOD BY AUTOMATED COUNT: 12.2 % (ref 11–15)
GLUCOSE BLD-MCNC: 113 MG/DL (ref 70–99)
HCT VFR BLD AUTO: 26.2 %
HGB BLD-MCNC: 8.9 G/DL
MCH RBC QN AUTO: 32.6 PG (ref 26–34)
MCHC RBC AUTO-ENTMCNC: 34 G/DL (ref 31–37)
MCV RBC AUTO: 96 FL
OSMOLALITY SERPL CALC.SUM OF ELEC: 276 MOSM/KG (ref 275–295)
PLATELET # BLD AUTO: 227 10(3)UL (ref 150–450)
POTASSIUM SERPL-SCNC: 3.9 MMOL/L (ref 3.5–5.1)
RBC # BLD AUTO: 2.73 X10(6)UL
SODIUM SERPL-SCNC: 133 MMOL/L (ref 136–145)
WBC # BLD AUTO: 7.8 X10(3) UL (ref 4–11)

## 2022-07-30 PROCEDURE — 99232 SBSQ HOSP IP/OBS MODERATE 35: CPT | Performed by: HOSPITALIST

## 2022-07-30 RX ORDER — MELATONIN
325
Qty: 30 TABLET | Refills: 0 | Status: SHIPPED | OUTPATIENT
Start: 2022-07-30 | End: 2022-08-12

## 2022-07-30 NOTE — ANESTHESIA POST-OP FOLLOW-UP NOTE
S/p Duramorph for TKA. Patient doing well. Pain controlled. strength and sensation intact. Otherwise no complaints.

## 2022-07-30 NOTE — PHYSICAL THERAPY NOTE
PHYSICAL THERAPY KNEE TREATMENT NOTE - INPATIENT     Room Number: 408/408-A             Presenting Problem: OA of L knee, pt is s/p L TKA       Problem List  Principal Problem:    Primary osteoarthritis of left knee  Active Problems:    Hypertension    Mild intermittent asthma    Preop testing      PHYSICAL THERAPY ASSESSMENT     Pt supine in bed and agreeable to therapy . Pt already in toilet with RN and pt was agreeabel to continue therapy from the washroom . Sit to stand with RW : CGA Ambulated with RW x 140 ft with CGA, pt given cues on correct body mechanics. Pt when reached the room, she sat in bedside chair and performed BLE ex x 20 reps in sitting . No complaints. Pt did seem tired . Water given to rehydrate. The patient's Approx Degree of Impairment: 46.58% has been calculated based on documentation in the [facility] '6 clicks' Inpatient Basic Mobility Short Form. Research supports that patients with this level of impairment may benefit from DC home with home health PT.    DISCHARGE RECOMMENDATIONS  PT Discharge Recommendations: 24 hour care/supervision    PLAN  PT Treatment Plan: Bed mobility; Body mechanics; Endurance; Energy conservation;Gait training;Transfer training  Frequency (Obs): Daily      SUBJECTIVE  I just woke up    OBJECTIVE  Precautions: Limb alert - left    WEIGHT BEARING STATUS           L Lower Extremity: Weight Bearing as Tolerated    PAIN ASSESSMENT   Ratin          BALANCE  Static Sitting: Fair +  Dynamic Sitting: Fair +  Static Standing: Fair -  Dynamic Standing: Fair -    ACTIVITY TOLERANCE                         O2 WALK       AM-PAC '6-Clicks' INPATIENT SHORT FORM - BASIC MOBILITY  How much difficulty does the patient currently have. ..   Patient Difficulty: Turning over in bed (including adjusting bedclothes, sheets and blankets)?: A Little   Patient Difficulty: Sitting down on and standing up from a chair with arms (e.g., wheelchair, bedside commode, etc.): None   Patient Difficulty: Moving from lying on back to sitting on the side of the bed?: A Little   How much help from another person does the patient currently need. ..    Help from Another: Moving to and from a bed to a chair (including a wheelchair)?: A Little   Help from Another: Need to walk in hospital room?: A Little   Help from Another: Climbing 3-5 steps with a railing?: A Lot     AM-PAC Score:  Raw Score: 18   Approx Degree of Impairment: 46.58%   Standardized Score (AM-PAC Scale): 43.63   CMS Modifier (G-Code): CK    FUNCTIONAL ABILITY STATUS  Functional Mobility/Gait Assessment  Gait Assistance: Contact guard assist  Distance (ft): 140 ft   Assistive Device: Rolling walker  Pattern: L Decreased stance time    Additional Information:     Exercises AM Session PM Session   Ankle Pumps 20 reps    Quad Sets 20 reps    Glut Sets 20 reps    Hip Abd/Add 20 reps    Heel slides  reps    Saq  reps    SLR  reps    Sitting Knee Flexion 20 reps    Standing heel/toe raises  reps    Standing knee flexion  reps    Extension stretch  1x      Comments: Pt participated in group session, tolerance was     Knee ROM                 Patient End of Session: Up in chair;Needs met;Call light within reach;RN aware of session/findings    CURRENT GOALS  Patient Goal Patient's self-stated goal is: home tomorrow    Goal #1 Patient is able to demonstrate supine - sit EOB @ level: supervision     Goal #1   Current Status Min a   Goal #2 Patient is able to demonstrate transfers EOB to/from Chair/Wheelchair at assistance level: supervision with walker - rolling     Goal #2  Current Status Min a   Goal #3 Patient is able to ambulate 300  feet with assist device: walker - rolling at assistance level: supervision   Goal #3   Current Status Pt amb 140 ft with RW and CGA   Goal #4 Patient will negotiate 1-4  stairs/one curb for community re-entry prn --pt has no stairs  w/ assistive device and min support    Goal #4   Current Status Pt refused, pt stated she has elevator and no need of learning   Goal #5 Patient to demonstrate independence with home activity/exercise instructions provided to patient in preparation for discharge.    Goal #5   Current Status In progeress   Goal #6 Left knee ROM 0-90 degree with respect for pain    Goal #6  Current Status In progress

## 2022-07-30 NOTE — PLAN OF CARE
Problem: Patient Centered Care  Goal: Patient preferences are identified and integrated in the patient's plan of care  Description: Interventions:  - What would you like us to know as we care for you?  I am a retired NICU nurse  - Provide timely, complete, and accurate information to patient/family  - Incorporate patient and family knowledge, values, beliefs, and cultural backgrounds into the planning and delivery of care  - Encourage patient/family to participate in care and decision-making at the level they choose  - Honor patient and family perspectives and choices  Outcome: Completed     Problem: Patient/Family Goals  Goal: Patient/Family Long Term Goal  Description: Patient's Long Term Goal: go home    Interventions:  - surgery  -therapy  -medications  - See additional Care Plan goals for specific interventions  Outcome: Completed  Goal: Patient/Family Short Term Goal  Description: Patient's Short Term Goal: resolution of nausea    Interventions:   - medications  - See additional Care Plan goals for specific interventions  Outcome: Completed     Problem: PAIN - ADULT  Goal: Verbalizes/displays adequate comfort level or patient's stated pain goal  Description: INTERVENTIONS:  - Encourage pt to monitor pain and request assistance  - Assess pain using appropriate pain scale  - Administer analgesics based on type and severity of pain and evaluate response  - Implement non-pharmacological measures as appropriate and evaluate response  - Consider cultural and social influences on pain and pain management  - Manage/alleviate anxiety  - Utilize distraction and/or relaxation techniques  - Monitor for opioid side effects  - Notify MD/LIP if interventions unsuccessful or patient reports new pain  - Anticipate increased pain with activity and pre-medicate as appropriate  Outcome: Completed     Problem: RISK FOR INFECTION - ADULT  Goal: Absence of fever/infection during anticipated neutropenic period  Description: INTERVENTIONS  - Monitor WBC  - Administer growth factors as ordered  - Implement neutropenic guidelines  Outcome: Completed     Problem: SAFETY ADULT - FALL  Goal: Free from fall injury  Description: INTERVENTIONS:  - Assess pt frequently for physical needs  - Identify cognitive and physical deficits and behaviors that affect risk of falls. - [place] fall precautions as indicated by assessment.  - Educate pt/family on patient safety including physical limitations  - Instruct pt to call for assistance with activity based on assessment  - Modify environment to reduce risk of injury  - Provide assistive devices as appropriate  - Consider OT/PT consult to assist with strengthening/mobility  - Encourage toileting schedule  Outcome: Completed     Problem: DISCHARGE PLANNING  Goal: Discharge to home or other facility with appropriate resources  Description: INTERVENTIONS:  - Identify barriers to discharge w/pt and caregiver  - Include patient/family/discharge partner in discharge planning  - Arrange for needed discharge resources and transportation as appropriate  - Identify discharge learning needs (meds, wound care, etc)  - Arrange for interpreters to assist at discharge as needed  - Consider post-discharge preferences of patient/family/discharge partner  - Complete POLST form as appropriate  - Assess patient's ability to be responsible for managing their own health  - Refer to Case Management Department for coordinating discharge planning if the patient needs post-hospital services based on physician/LIP order or complex needs related to functional status, cognitive ability or social support system  Outcome: Completed   Patient cleared for discharge. After visit summary reviewed with patient. Aware to  6 prescriptions at pharmacy on file. Personal belongings sent with patient, gel ice packs provided, transported via private vehicle.

## 2022-07-30 NOTE — CM/SW NOTE
07/29/22 1100   Discharge disposition   Expected discharge disposition Home-Health   Post Acute Care Provider [facility]   Discharge transportation Private car     Received call from RN/Milena - pt cleared for DC today 7/30. Inquiring if [place] arranged. Per chart review, plan for home w/ [facility] [place]. Gina Palomares w/ [facility] notified. Pt is cleared from SW/CM stand point.     PLAN: Home w/ [facility] [place]      [address] [facility]

## 2022-07-30 NOTE — PLAN OF CARE
Pt is A&O x4. Breathing on room air. Maintain R arm precaution. CMS intact. SCDs and Aspirin for DVT prophylaxis. Continuous IV fluid infusing. Voiding freely. Up with 1 assist and walker. Given Tums prn for upset stomach. Given Norco prn for pain. D.C. plan is return home when stable. Problem: Patient Centered Care  Goal: Patient preferences are identified and integrated in the patient's plan of care  Description: Interventions:  - What would you like us to know as we care for you? I am a retired nurse.   - Provide timely, complete, and accurate information to patient/family  - Incorporate patient and family knowledge, values, beliefs, and cultural backgrounds into the planning and delivery of care  - Encourage patient/family to participate in care and decision-making at the level they choose  - Honor patient and family perspectives and choices  Outcome: Progressing     Problem: Patient/Family Goals  Goal: Patient/Family Long Term Goal  Description: Patient's Long Term Goal: No pain    Interventions:  - Reposition  - Gel ice pack  - Pain med prn  - See additional Care Plan goals for specific interventions  Outcome: Progressing  Goal: Patient/Family Short Term Goal  Description: Patient's Short Term Goal: Return home    Interventions:   - Follows plan of care  - Monitors labs   - PT  - See additional Care Plan goals for specific interventions  Outcome: Progressing     Problem: PAIN - ADULT  Goal: Verbalizes/displays adequate comfort level or patient's stated pain goal  Description: INTERVENTIONS:  - Encourage pt to monitor pain and request assistance  - Assess pain using appropriate pain scale  - Administer analgesics based on type and severity of pain and evaluate response  - Implement non-pharmacological measures as appropriate and evaluate response  - Consider cultural and social influences on pain and pain management  - Manage/alleviate anxiety  - Utilize distraction and/or relaxation techniques  - Monitor for opioid side effects  - Notify MD/LIP if interventions unsuccessful or patient reports new pain  - Anticipate increased pain with activity and pre-medicate as appropriate  Outcome: Progressing     Problem: RISK FOR INFECTION - ADULT  Goal: Absence of fever/infection during anticipated neutropenic period  Description: INTERVENTIONS  - Monitor WBC  - Administer growth factors as ordered  - Implement neutropenic guidelines  Outcome: Progressing     Problem: SAFETY ADULT - FALL  Goal: Free from fall injury  Description: INTERVENTIONS:  - Assess pt frequently for physical needs  - Identify cognitive and physical deficits and behaviors that affect risk of falls.   - [place] fall precautions as indicated by assessment.  - Educate pt/family on patient safety including physical limitations  - Instruct pt to call for assistance with activity based on assessment  - Modify environment to reduce risk of injury  - Provide assistive devices as appropriate  - Consider OT/PT consult to assist with strengthening/mobility  - Encourage toileting schedule  Outcome: Progressing     Problem: DISCHARGE PLANNING  Goal: Discharge to home or other facility with appropriate resources  Description: INTERVENTIONS:  - Identify barriers to discharge w/pt and caregiver  - Include patient/family/discharge partner in discharge planning  - Arrange for needed discharge resources and transportation as appropriate  - Identify discharge learning needs (meds, wound care, etc)  - Arrange for interpreters to assist at discharge as needed  - Consider post-discharge preferences of patient/family/discharge partner  - Complete POLST form as appropriate  - Assess patient's ability to be responsible for managing their own health  - Refer to Case Management Department for coordinating discharge planning if the patient needs post-hospital services based on physician/LIP order or complex needs related to functional status, cognitive ability or social support system  Outcome: Progressing

## 2022-08-25 ENCOUNTER — LAB ENCOUNTER (OUTPATIENT)
Dept: LAB | Facility: HOSPITAL | Age: 77
End: 2022-08-25
Attending: FAMILY MEDICINE
Payer: MEDICARE

## 2022-08-25 PROCEDURE — 80053 COMPREHEN METABOLIC PANEL: CPT | Performed by: FAMILY MEDICINE

## 2022-08-25 PROCEDURE — 85025 COMPLETE CBC W/AUTO DIFF WBC: CPT | Performed by: FAMILY MEDICINE

## 2022-08-25 PROCEDURE — 36415 COLL VENOUS BLD VENIPUNCTURE: CPT | Performed by: FAMILY MEDICINE

## 2022-08-26 PROBLEM — Z90.710 ACQUIRED ABSENCE OF BOTH CERVIX AND UTERUS: Status: ACTIVE | Noted: 2022-01-01

## 2023-03-03 ENCOUNTER — HOSPITAL ENCOUNTER (EMERGENCY)
Facility: HOSPITAL | Age: 78
Discharge: HOME | End: 2023-03-03
Attending: EMERGENCY MEDICINE
Payer: MEDICARE

## 2023-03-03 ENCOUNTER — APPOINTMENT (OUTPATIENT)
Dept: GENERAL RADIOLOGY | Facility: HOSPITAL | Age: 78
End: 2023-03-03
Payer: MEDICARE

## 2023-03-03 VITALS
HEIGHT: 67 IN | OXYGEN SATURATION: 98 % | WEIGHT: 238 LBS | HEART RATE: 86 BPM | BODY MASS INDEX: 37.35 KG/M2 | SYSTOLIC BLOOD PRESSURE: 131 MMHG | DIASTOLIC BLOOD PRESSURE: 66 MMHG | TEMPERATURE: 100 F | RESPIRATION RATE: 12 BRPM

## 2023-03-03 DIAGNOSIS — R00.2 PALPITATIONS: Primary | ICD-10-CM

## 2023-03-03 LAB
ALBUMIN SERPL-MCNC: 4 G/DL (ref 3.4–5)
ALBUMIN/GLOB SERPL: 1.1 {RATIO} (ref 1–2)
ALP LIVER SERPL-CCNC: 88 U/L
ALT SERPL-CCNC: 20 U/L
ANION GAP SERPL CALC-SCNC: 7 MMOL/L (ref 0–18)
AST SERPL-CCNC: 16 U/L (ref 15–37)
BASOPHILS # BLD AUTO: 0.05 X10(3) UL (ref 0–0.2)
BASOPHILS NFR BLD AUTO: 0.6 %
BILIRUB SERPL-MCNC: 0.5 MG/DL (ref 0.1–2)
BUN BLD-MCNC: 16 MG/DL (ref 7–18)
BUN/CREAT SERPL: 18 (ref 10–20)
CALCIUM BLD-MCNC: 9.3 MG/DL (ref 8.5–10.1)
CHLORIDE SERPL-SCNC: 104 MMOL/L (ref 98–112)
CO2 SERPL-SCNC: 26 MMOL/L (ref 21–32)
CREAT BLD-MCNC: 0.89 MG/DL
D DIMER PPP FEU-MCNC: 0.62 UG/ML FEU (ref ?–0.75)
DEPRECATED RDW RBC AUTO: 41.1 FL (ref 35.1–46.3)
EOSINOPHIL # BLD AUTO: 0.05 X10(3) UL (ref 0–0.7)
EOSINOPHIL NFR BLD AUTO: 0.6 %
ERYTHROCYTE [DISTWIDTH] IN BLOOD BY AUTOMATED COUNT: 11.8 % (ref 11–15)
GFR SERPLBLD BASED ON 1.73 SQ M-ARVRAT: 68 ML/MIN/1.73M2 (ref 60–?)
GLOBULIN PLAS-MCNC: 3.8 G/DL (ref 2.8–4.4)
GLUCOSE BLD-MCNC: 120 MG/DL (ref 70–99)
HCT VFR BLD AUTO: 38.7 %
HGB BLD-MCNC: 13.2 G/DL
IMM GRANULOCYTES # BLD AUTO: 0.03 X10(3) UL (ref 0–1)
IMM GRANULOCYTES NFR BLD: 0.4 %
LYMPHOCYTES # BLD AUTO: 2.04 X10(3) UL (ref 1–4)
LYMPHOCYTES NFR BLD AUTO: 26 %
MCH RBC QN AUTO: 32.4 PG (ref 26–34)
MCHC RBC AUTO-ENTMCNC: 34.1 G/DL (ref 31–37)
MCV RBC AUTO: 94.9 FL
MONOCYTES # BLD AUTO: 0.79 X10(3) UL (ref 0.1–1)
MONOCYTES NFR BLD AUTO: 10.1 %
NEUTROPHILS # BLD AUTO: 4.88 X10 (3) UL (ref 1.5–7.7)
NEUTROPHILS # BLD AUTO: 4.88 X10(3) UL (ref 1.5–7.7)
NEUTROPHILS NFR BLD AUTO: 62.3 %
OSMOLALITY SERPL CALC.SUM OF ELEC: 286 MOSM/KG (ref 275–295)
PLATELET # BLD AUTO: 311 10(3)UL (ref 150–450)
POTASSIUM SERPL-SCNC: 4.3 MMOL/L (ref 3.5–5.1)
PROT SERPL-MCNC: 7.8 G/DL (ref 6.4–8.2)
RBC # BLD AUTO: 4.08 X10(6)UL
SODIUM SERPL-SCNC: 137 MMOL/L (ref 136–145)
TROPONIN I HIGH SENSITIVITY: 5 NG/L
TROPONIN I HIGH SENSITIVITY: 5 NG/L
WBC # BLD AUTO: 7.8 X10(3) UL (ref 4–11)

## 2023-03-03 PROCEDURE — 93010 ELECTROCARDIOGRAM REPORT: CPT

## 2023-03-03 PROCEDURE — 84484 ASSAY OF TROPONIN QUANT: CPT | Performed by: EMERGENCY MEDICINE

## 2023-03-03 PROCEDURE — 93005 ELECTROCARDIOGRAM TRACING: CPT

## 2023-03-03 PROCEDURE — 84484 ASSAY OF TROPONIN QUANT: CPT

## 2023-03-03 PROCEDURE — 36415 COLL VENOUS BLD VENIPUNCTURE: CPT

## 2023-03-03 PROCEDURE — 85025 COMPLETE CBC W/AUTO DIFF WBC: CPT | Performed by: EMERGENCY MEDICINE

## 2023-03-03 PROCEDURE — 80053 COMPREHEN METABOLIC PANEL: CPT

## 2023-03-03 PROCEDURE — 85379 FIBRIN DEGRADATION QUANT: CPT | Performed by: EMERGENCY MEDICINE

## 2023-03-03 PROCEDURE — 85025 COMPLETE CBC W/AUTO DIFF WBC: CPT

## 2023-03-03 PROCEDURE — 99284 EMERGENCY DEPT VISIT MOD MDM: CPT

## 2023-03-03 PROCEDURE — 71045 X-RAY EXAM CHEST 1 VIEW: CPT

## 2023-03-03 PROCEDURE — 99285 EMERGENCY DEPT VISIT HI MDM: CPT

## 2023-03-03 PROCEDURE — 80053 COMPREHEN METABOLIC PANEL: CPT | Performed by: EMERGENCY MEDICINE

## 2023-03-03 NOTE — ED INITIAL ASSESSMENT (HPI)
Patient from home, c/c rapid heart rate with chest tightness, started last night. Hx of asthma, denies ELTON or SOB. Took aspirin last night, and at 3pm PTA, no relief with symptoms of tightness.

## 2023-03-04 LAB
ATRIAL RATE: 101 BPM
P AXIS: 54 DEGREES
P-R INTERVAL: 154 MS
Q-T INTERVAL: 322 MS
QRS DURATION: 88 MS
QTC CALCULATION (BEZET): 417 MS
R AXIS: 19 DEGREES
T AXIS: 30 DEGREES
VENTRICULAR RATE: 101 BPM

## 2023-03-14 ENCOUNTER — HOSPITAL ENCOUNTER (OUTPATIENT)
Dept: CV DIAGNOSTICS | Facility: HOSPITAL | Age: 78
Discharge: HOME | End: 2023-03-14
Attending: FAMILY MEDICINE
Payer: MEDICARE

## 2023-03-14 DIAGNOSIS — R00.2 HEART PALPITATIONS: ICD-10-CM

## 2023-03-14 PROCEDURE — 93242 EXT ECG>48HR<7D RECORDING: CPT | Performed by: FAMILY MEDICINE

## 2023-03-14 PROCEDURE — 93243 EXT ECG>48HR<7D SCAN A/R: CPT | Performed by: FAMILY MEDICINE

## 2023-03-22 PROBLEM — H81.10 BENIGN PAROXYSMAL POSITIONAL VERTIGO: Status: ACTIVE | Noted: 2023-03-22

## 2023-03-22 PROBLEM — R00.2 HEART PALPITATIONS: Status: ACTIVE | Noted: 2023-03-22

## 2023-03-22 PROBLEM — T78.40XA ALLERGIES: Status: ACTIVE | Noted: 2023-03-22

## 2023-03-22 PROBLEM — E78.00 HIGH CHOLESTEROL: Status: ACTIVE | Noted: 2023-03-22

## 2023-08-07 ENCOUNTER — LAB ENCOUNTER (OUTPATIENT)
Dept: LAB | Facility: HOSPITAL | Age: 78
End: 2023-08-07
Attending: FAMILY MEDICINE
Payer: MEDICARE

## 2023-08-07 ENCOUNTER — HOSPITAL ENCOUNTER (OUTPATIENT)
Dept: GENERAL RADIOLOGY | Facility: HOSPITAL | Age: 78
Discharge: HOME | End: 2023-08-07
Attending: FAMILY MEDICINE
Payer: MEDICARE

## 2023-08-07 DIAGNOSIS — Z01.818 PRE-OP EXAM: ICD-10-CM

## 2023-08-07 DIAGNOSIS — Z01.818 PRE-OP TESTING: ICD-10-CM

## 2023-08-07 LAB
ATRIAL RATE: 83 BPM
P AXIS: 47 DEGREES
P-R INTERVAL: 146 MS
Q-T INTERVAL: 348 MS
QRS DURATION: 90 MS
QTC CALCULATION (BEZET): 408 MS
R AXIS: 4 DEGREES
T AXIS: 36 DEGREES
VENTRICULAR RATE: 83 BPM

## 2023-08-07 PROCEDURE — 85610 PROTHROMBIN TIME: CPT | Performed by: FAMILY MEDICINE

## 2023-08-07 PROCEDURE — 86901 BLOOD TYPING SEROLOGIC RH(D): CPT | Performed by: FAMILY MEDICINE

## 2023-08-07 PROCEDURE — 86900 BLOOD TYPING SEROLOGIC ABO: CPT | Performed by: FAMILY MEDICINE

## 2023-08-07 PROCEDURE — 71046 X-RAY EXAM CHEST 2 VIEWS: CPT | Performed by: FAMILY MEDICINE

## 2023-08-07 PROCEDURE — 87081 CULTURE SCREEN ONLY: CPT | Performed by: FAMILY MEDICINE

## 2023-08-07 PROCEDURE — 93010 ELECTROCARDIOGRAM REPORT: CPT | Performed by: INTERNAL MEDICINE

## 2023-08-07 PROCEDURE — 85730 THROMBOPLASTIN TIME PARTIAL: CPT | Performed by: FAMILY MEDICINE

## 2023-08-07 PROCEDURE — 86850 RBC ANTIBODY SCREEN: CPT | Performed by: FAMILY MEDICINE

## 2023-08-07 PROCEDURE — 80053 COMPREHEN METABOLIC PANEL: CPT | Performed by: FAMILY MEDICINE

## 2023-08-07 PROCEDURE — 36415 COLL VENOUS BLD VENIPUNCTURE: CPT | Performed by: FAMILY MEDICINE

## 2023-08-07 PROCEDURE — 87081 CULTURE SCREEN ONLY: CPT

## 2023-08-07 PROCEDURE — 93005 ELECTROCARDIOGRAM TRACING: CPT

## 2023-08-07 PROCEDURE — 81001 URINALYSIS AUTO W/SCOPE: CPT | Performed by: FAMILY MEDICINE

## 2023-08-11 ENCOUNTER — LAB ENCOUNTER (OUTPATIENT)
Dept: LAB | Facility: HOSPITAL | Age: 78
End: 2023-08-11
Attending: FAMILY MEDICINE
Payer: MEDICARE

## 2023-08-11 PROCEDURE — 85025 COMPLETE CBC W/AUTO DIFF WBC: CPT | Performed by: FAMILY MEDICINE

## 2023-08-11 PROCEDURE — 36415 COLL VENOUS BLD VENIPUNCTURE: CPT | Performed by: FAMILY MEDICINE

## 2023-08-22 RX ORDER — ASPIRIN 81 MG/1
81 TABLET ORAL DAILY
COMMUNITY
End: 2023-08-27

## 2023-08-24 NOTE — DISCHARGE INSTRUCTIONS
Starting today, work on straightening and bending the knee. Work several hours a day on bending and straightening your knee. Pump feet often to help prevent blood clots. Weight bearing as tolerated with walker. When sitting in a chair elevate the leg. Ice for up to 20 minutes at a time. Place towel between ice and skin. For wound care, each Mepilex dressing can be used for up to 3 days if it is not soiled. Peel back dressing and paint wound with betadine and Q-tips daily. Do not double dip Q-tips. Sponge bath only. Take medications as prescribed in the discharge paperwork. Call Dr. Christie Thompson office to make a follow-up appointment for 2 weeks. Sometimes managing your health at home requires assistance. The [facility] team has recognized your preference to use [facility]. They can be reached by phone at [phone]. The fax number for your reference is [phone]. A representative from the home health agency will contact you or your family to schedule your first visit.

## 2023-08-25 ENCOUNTER — APPOINTMENT (OUTPATIENT)
Dept: GENERAL RADIOLOGY | Facility: HOSPITAL | Age: 78
End: 2023-08-25
Attending: ORTHOPAEDIC SURGERY
Payer: MEDICARE

## 2023-08-25 ENCOUNTER — ANESTHESIA (OUTPATIENT)
Dept: SURGERY | Facility: HOSPITAL | Age: 78
End: 2023-08-25
Payer: MEDICARE

## 2023-08-25 ENCOUNTER — HOSPITAL ENCOUNTER (INPATIENT)
Facility: HOSPITAL | Age: 78
LOS: 2 days | Discharge: HOME HEALTH | End: 2023-08-27
Attending: ORTHOPAEDIC SURGERY | Admitting: ORTHOPAEDIC SURGERY
Payer: MEDICARE

## 2023-08-25 ENCOUNTER — ANESTHESIA EVENT (OUTPATIENT)
Dept: SURGERY | Facility: HOSPITAL | Age: 78
End: 2023-08-25
Payer: MEDICARE

## 2023-08-25 PROBLEM — J45.909 ASTHMA: Status: ACTIVE | Noted: 2023-08-25

## 2023-08-25 PROBLEM — M17.11 PRIMARY OSTEOARTHRITIS OF RIGHT KNEE: Status: ACTIVE | Noted: 2023-08-25

## 2023-08-25 PROBLEM — J45.909 ASTHMA (HCC): Status: ACTIVE | Noted: 2023-08-25

## 2023-08-25 PROBLEM — I10 ESSENTIAL HYPERTENSION: Status: ACTIVE | Noted: 2017-02-17

## 2023-08-25 PROCEDURE — 99232 SBSQ HOSP IP/OBS MODERATE 35: CPT | Performed by: HOSPITALIST

## 2023-08-25 PROCEDURE — 73560 X-RAY EXAM OF KNEE 1 OR 2: CPT | Performed by: ORTHOPAEDIC SURGERY

## 2023-08-25 PROCEDURE — 0SRC0J9 REPLACEMENT OF RIGHT KNEE JOINT WITH SYNTHETIC SUBSTITUTE, CEMENTED, OPEN APPROACH: ICD-10-PCS | Performed by: ORTHOPAEDIC SURGERY

## 2023-08-25 DEVICE — IMPLANTABLE DEVICE
Type: IMPLANTABLE DEVICE | Site: KNEE | Status: FUNCTIONAL
Brand: PERSONA® VIVACIT-E®

## 2023-08-25 DEVICE — IMPLANTABLE DEVICE
Type: IMPLANTABLE DEVICE | Site: KNEE | Status: FUNCTIONAL
Brand: PERSONA®

## 2023-08-25 DEVICE — IMPLANTABLE DEVICE
Type: IMPLANTABLE DEVICE | Site: KNEE | Status: FUNCTIONAL
Brand: PERSONA® NATURAL TIBIA®

## 2023-08-25 DEVICE — IMPLANTABLE DEVICE
Type: IMPLANTABLE DEVICE | Site: KNEE | Status: FUNCTIONAL
Brand: REFOBACIN® BONE CEMENT R

## 2023-08-25 RX ORDER — VANCOMYCIN HYDROCHLORIDE
12.5 ONCE
Status: COMPLETED | OUTPATIENT
Start: 2023-08-26 | End: 2023-08-26

## 2023-08-25 RX ORDER — MORPHINE SULFATE 4 MG/ML
2 INJECTION, SOLUTION INTRAMUSCULAR; INTRAVENOUS EVERY 10 MIN PRN
Status: DISCONTINUED | OUTPATIENT
Start: 2023-08-25 | End: 2023-08-25 | Stop reason: HOSPADM

## 2023-08-25 RX ORDER — ONDANSETRON 2 MG/ML
4 INJECTION INTRAMUSCULAR; INTRAVENOUS EVERY 6 HOURS PRN
Status: DISCONTINUED | OUTPATIENT
Start: 2023-08-25 | End: 2023-08-27

## 2023-08-25 RX ORDER — IBUPROFEN 400 MG/1
400 TABLET ORAL EVERY 8 HOURS PRN
Qty: 30 TABLET | Refills: 0 | Status: SHIPPED | OUTPATIENT
Start: 2023-08-25

## 2023-08-25 RX ORDER — MIDAZOLAM HYDROCHLORIDE 1 MG/ML
INJECTION INTRAMUSCULAR; INTRAVENOUS AS NEEDED
Status: DISCONTINUED | OUTPATIENT
Start: 2023-08-25 | End: 2023-08-25 | Stop reason: SURG

## 2023-08-25 RX ORDER — ONDANSETRON 2 MG/ML
4 INJECTION INTRAMUSCULAR; INTRAVENOUS ONCE AS NEEDED
Status: ACTIVE | OUTPATIENT
Start: 2023-08-25 | End: 2023-08-25

## 2023-08-25 RX ORDER — HYDROCODONE BITARTRATE AND ACETAMINOPHEN 7.5; 325 MG/1; MG/1
1 TABLET ORAL EVERY 6 HOURS PRN
Status: DISPENSED | OUTPATIENT
Start: 2023-08-25 | End: 2023-08-26

## 2023-08-25 RX ORDER — DIPHENHYDRAMINE HYDROCHLORIDE 50 MG/ML
12.5 INJECTION INTRAMUSCULAR; INTRAVENOUS EVERY 4 HOURS PRN
Status: ACTIVE | OUTPATIENT
Start: 2023-08-25 | End: 2023-08-26

## 2023-08-25 RX ORDER — SODIUM CHLORIDE, SODIUM LACTATE, POTASSIUM CHLORIDE, CALCIUM CHLORIDE 600; 310; 30; 20 MG/100ML; MG/100ML; MG/100ML; MG/100ML
INJECTION, SOLUTION INTRAVENOUS CONTINUOUS
Status: DISCONTINUED | OUTPATIENT
Start: 2023-08-25 | End: 2023-08-25 | Stop reason: HOSPADM

## 2023-08-25 RX ORDER — ACETAMINOPHEN 325 MG/1
650 TABLET ORAL EVERY 8 HOURS PRN
Qty: 40 TABLET | Refills: 0 | Status: SHIPPED | OUTPATIENT
Start: 2023-08-26

## 2023-08-25 RX ORDER — HYDROMORPHONE HYDROCHLORIDE 1 MG/ML
0.2 INJECTION, SOLUTION INTRAMUSCULAR; INTRAVENOUS; SUBCUTANEOUS EVERY 5 MIN PRN
Status: DISCONTINUED | OUTPATIENT
Start: 2023-08-25 | End: 2023-08-25 | Stop reason: HOSPADM

## 2023-08-25 RX ORDER — AMLODIPINE BESYLATE AND BENAZEPRIL HYDROCHLORIDE 5; 10 MG/1; MG/1
1 CAPSULE ORAL DAILY
Status: DISCONTINUED | OUTPATIENT
Start: 2023-08-25 | End: 2023-08-25 | Stop reason: ALTCHOICE

## 2023-08-25 RX ORDER — PROCHLORPERAZINE EDISYLATE 5 MG/ML
5 INJECTION INTRAMUSCULAR; INTRAVENOUS ONCE AS NEEDED
Status: ACTIVE | OUTPATIENT
Start: 2023-08-25 | End: 2023-08-25

## 2023-08-25 RX ORDER — MORPHINE SULFATE 1 MG/ML
INJECTION, SOLUTION EPIDURAL; INTRATHECAL; INTRAVENOUS
Status: COMPLETED | OUTPATIENT
Start: 2023-08-25 | End: 2023-08-25

## 2023-08-25 RX ORDER — MORPHINE SULFATE 4 MG/ML
4 INJECTION, SOLUTION INTRAMUSCULAR; INTRAVENOUS EVERY 10 MIN PRN
Status: DISCONTINUED | OUTPATIENT
Start: 2023-08-25 | End: 2023-08-25 | Stop reason: HOSPADM

## 2023-08-25 RX ORDER — POLYETHYLENE GLYCOL 3350 17 G/17G
17 POWDER, FOR SOLUTION ORAL DAILY PRN
Status: DISCONTINUED | OUTPATIENT
Start: 2023-08-25 | End: 2023-08-27

## 2023-08-25 RX ORDER — DOXEPIN HYDROCHLORIDE 50 MG/1
1 CAPSULE ORAL DAILY
Status: DISCONTINUED | OUTPATIENT
Start: 2023-08-25 | End: 2023-08-27

## 2023-08-25 RX ORDER — ACETAMINOPHEN 500 MG
1000 TABLET ORAL ONCE
Status: COMPLETED | OUTPATIENT
Start: 2023-08-25 | End: 2023-08-25

## 2023-08-25 RX ORDER — ENOXAPARIN SODIUM 100 MG/ML
40 INJECTION SUBCUTANEOUS ONCE
Status: COMPLETED | OUTPATIENT
Start: 2023-08-25 | End: 2023-08-25

## 2023-08-25 RX ORDER — ALBUTEROL SULFATE 90 UG/1
2 AEROSOL, METERED RESPIRATORY (INHALATION) EVERY 6 HOURS PRN
Status: DISCONTINUED | OUTPATIENT
Start: 2023-08-25 | End: 2023-08-27

## 2023-08-25 RX ORDER — ONDANSETRON 2 MG/ML
4 INJECTION INTRAMUSCULAR; INTRAVENOUS EVERY 6 HOURS PRN
Status: DISCONTINUED | OUTPATIENT
Start: 2023-08-25 | End: 2023-08-25 | Stop reason: HOSPADM

## 2023-08-25 RX ORDER — EPHEDRINE SULFATE 50 MG/ML
INJECTION INTRAVENOUS AS NEEDED
Status: DISCONTINUED | OUTPATIENT
Start: 2023-08-25 | End: 2023-08-25 | Stop reason: SURG

## 2023-08-25 RX ORDER — METOCLOPRAMIDE HYDROCHLORIDE 5 MG/ML
10 INJECTION INTRAMUSCULAR; INTRAVENOUS EVERY 8 HOURS PRN
Status: DISCONTINUED | OUTPATIENT
Start: 2023-08-25 | End: 2023-08-27

## 2023-08-25 RX ORDER — ENEMA 19; 7 G/133ML; G/133ML
1 ENEMA RECTAL ONCE AS NEEDED
Status: DISCONTINUED | OUTPATIENT
Start: 2023-08-25 | End: 2023-08-27

## 2023-08-25 RX ORDER — MORPHINE SULFATE 10 MG/ML
6 INJECTION, SOLUTION INTRAMUSCULAR; INTRAVENOUS EVERY 10 MIN PRN
Status: DISCONTINUED | OUTPATIENT
Start: 2023-08-25 | End: 2023-08-25 | Stop reason: HOSPADM

## 2023-08-25 RX ORDER — ASPIRIN 325 MG
325 TABLET, DELAYED RELEASE (ENTERIC COATED) ORAL 2 TIMES DAILY
Status: DISCONTINUED | OUTPATIENT
Start: 2023-08-25 | End: 2023-08-27

## 2023-08-25 RX ORDER — HYDROMORPHONE HYDROCHLORIDE 1 MG/ML
0.4 INJECTION, SOLUTION INTRAMUSCULAR; INTRAVENOUS; SUBCUTANEOUS EVERY 5 MIN PRN
Status: DISCONTINUED | OUTPATIENT
Start: 2023-08-25 | End: 2023-08-25 | Stop reason: HOSPADM

## 2023-08-25 RX ORDER — DOCUSATE SODIUM 100 MG/1
100 CAPSULE, LIQUID FILLED ORAL 2 TIMES DAILY
Status: DISCONTINUED | OUTPATIENT
Start: 2023-08-25 | End: 2023-08-27

## 2023-08-25 RX ORDER — PSEUDOEPHEDRINE HCL 30 MG
100 TABLET ORAL 2 TIMES DAILY
Qty: 20 CAPSULE | Refills: 0 | Status: SHIPPED | OUTPATIENT
Start: 2023-08-25

## 2023-08-25 RX ORDER — HYDROCODONE BITARTRATE AND ACETAMINOPHEN 7.5; 325 MG/1; MG/1
2 TABLET ORAL EVERY 6 HOURS PRN
Status: ACTIVE | OUTPATIENT
Start: 2023-08-25 | End: 2023-08-26

## 2023-08-25 RX ORDER — SODIUM CHLORIDE, SODIUM LACTATE, POTASSIUM CHLORIDE, CALCIUM CHLORIDE 600; 310; 30; 20 MG/100ML; MG/100ML; MG/100ML; MG/100ML
INJECTION, SOLUTION INTRAVENOUS CONTINUOUS
Status: DISCONTINUED | OUTPATIENT
Start: 2023-08-25 | End: 2023-08-25

## 2023-08-25 RX ORDER — HYDROCODONE BITARTRATE AND ACETAMINOPHEN 10; 325 MG/1; MG/1
1 TABLET ORAL EVERY 4 HOURS PRN
Status: DISCONTINUED | OUTPATIENT
Start: 2023-08-26 | End: 2023-08-27

## 2023-08-25 RX ORDER — ACETAMINOPHEN 325 MG/1
650 TABLET ORAL EVERY 6 HOURS PRN
Status: ACTIVE | OUTPATIENT
Start: 2023-08-25 | End: 2023-08-26

## 2023-08-25 RX ORDER — DIPHENHYDRAMINE HYDROCHLORIDE 50 MG/ML
25 INJECTION INTRAMUSCULAR; INTRAVENOUS ONCE AS NEEDED
Status: ACTIVE | OUTPATIENT
Start: 2023-08-25 | End: 2023-08-25

## 2023-08-25 RX ORDER — VANCOMYCIN HYDROCHLORIDE
15 ONCE
Status: COMPLETED | OUTPATIENT
Start: 2023-08-25 | End: 2023-08-25

## 2023-08-25 RX ORDER — ASPIRIN 325 MG
325 TABLET, DELAYED RELEASE (ENTERIC COATED) ORAL 2 TIMES DAILY
Qty: 40 TABLET | Refills: 0 | Status: SHIPPED | OUTPATIENT
Start: 2023-08-25

## 2023-08-25 RX ORDER — METOCLOPRAMIDE HYDROCHLORIDE 5 MG/ML
10 INJECTION INTRAMUSCULAR; INTRAVENOUS EVERY 8 HOURS PRN
Status: DISCONTINUED | OUTPATIENT
Start: 2023-08-25 | End: 2023-08-25 | Stop reason: HOSPADM

## 2023-08-25 RX ORDER — METOCLOPRAMIDE 10 MG/1
10 TABLET ORAL ONCE
Status: COMPLETED | OUTPATIENT
Start: 2023-08-25 | End: 2023-08-25

## 2023-08-25 RX ORDER — NALOXONE HYDROCHLORIDE 0.4 MG/ML
0.08 INJECTION, SOLUTION INTRAMUSCULAR; INTRAVENOUS; SUBCUTANEOUS
Status: ACTIVE | OUTPATIENT
Start: 2023-08-25 | End: 2023-08-26

## 2023-08-25 RX ORDER — HYDROMORPHONE HYDROCHLORIDE 1 MG/ML
0.6 INJECTION, SOLUTION INTRAMUSCULAR; INTRAVENOUS; SUBCUTANEOUS EVERY 5 MIN PRN
Status: DISCONTINUED | OUTPATIENT
Start: 2023-08-25 | End: 2023-08-25 | Stop reason: HOSPADM

## 2023-08-25 RX ORDER — SENNOSIDES 8.6 MG
17.2 TABLET ORAL NIGHTLY
Status: DISCONTINUED | OUTPATIENT
Start: 2023-08-25 | End: 2023-08-27

## 2023-08-25 RX ORDER — HYDROCODONE BITARTRATE AND ACETAMINOPHEN 10; 325 MG/1; MG/1
1 TABLET ORAL EVERY 8 HOURS PRN
Qty: 25 TABLET | Refills: 0 | Status: SHIPPED | OUTPATIENT
Start: 2023-08-25

## 2023-08-25 RX ORDER — TRANEXAMIC ACID 10 MG/ML
INJECTION, SOLUTION INTRAVENOUS AS NEEDED
Status: DISCONTINUED | OUTPATIENT
Start: 2023-08-25 | End: 2023-08-25 | Stop reason: SURG

## 2023-08-25 RX ORDER — NALOXONE HYDROCHLORIDE 0.4 MG/ML
0.08 INJECTION, SOLUTION INTRAMUSCULAR; INTRAVENOUS; SUBCUTANEOUS AS NEEDED
Status: DISCONTINUED | OUTPATIENT
Start: 2023-08-25 | End: 2023-08-25 | Stop reason: HOSPADM

## 2023-08-25 RX ORDER — HYDROMORPHONE HYDROCHLORIDE 1 MG/ML
0.6 INJECTION, SOLUTION INTRAMUSCULAR; INTRAVENOUS; SUBCUTANEOUS
Status: ACTIVE | OUTPATIENT
Start: 2023-08-25 | End: 2023-08-26

## 2023-08-25 RX ORDER — BISACODYL 10 MG
10 SUPPOSITORY, RECTAL RECTAL
Status: DISCONTINUED | OUTPATIENT
Start: 2023-08-25 | End: 2023-08-27

## 2023-08-25 RX ORDER — DIPHENHYDRAMINE HCL 25 MG
25 CAPSULE ORAL EVERY 4 HOURS PRN
Status: ACTIVE | OUTPATIENT
Start: 2023-08-25 | End: 2023-08-26

## 2023-08-25 RX ORDER — PHENYLEPHRINE HCL 10 MG/ML
VIAL (ML) INJECTION AS NEEDED
Status: DISCONTINUED | OUTPATIENT
Start: 2023-08-25 | End: 2023-08-25 | Stop reason: SURG

## 2023-08-25 RX ORDER — NALBUPHINE HYDROCHLORIDE 10 MG/ML
2.5 INJECTION, SOLUTION INTRAMUSCULAR; INTRAVENOUS; SUBCUTANEOUS EVERY 4 HOURS PRN
Status: ACTIVE | OUTPATIENT
Start: 2023-08-25 | End: 2023-08-26

## 2023-08-25 RX ORDER — FLUTICASONE FUROATE AND VILANTEROL 200; 25 UG/1; UG/1
1 POWDER RESPIRATORY (INHALATION) DAILY
Status: DISCONTINUED | OUTPATIENT
Start: 2023-08-25 | End: 2023-08-27

## 2023-08-25 RX ORDER — SODIUM CHLORIDE 9 MG/ML
INJECTION, SOLUTION INTRAVENOUS CONTINUOUS
Status: DISCONTINUED | OUTPATIENT
Start: 2023-08-25 | End: 2023-08-27

## 2023-08-25 RX ORDER — ONDANSETRON 2 MG/ML
INJECTION INTRAMUSCULAR; INTRAVENOUS AS NEEDED
Status: DISCONTINUED | OUTPATIENT
Start: 2023-08-25 | End: 2023-08-25 | Stop reason: SURG

## 2023-08-25 RX ORDER — HALOPERIDOL 5 MG/ML
0.5 INJECTION INTRAMUSCULAR ONCE AS NEEDED
Status: ACTIVE | OUTPATIENT
Start: 2023-08-25 | End: 2023-08-25

## 2023-08-25 RX ORDER — EPINEPHRINE 0.3 MG/.3ML
0.3 INJECTION SUBCUTANEOUS AS NEEDED
Status: DISCONTINUED | OUTPATIENT
Start: 2023-08-25 | End: 2023-08-27

## 2023-08-25 RX ORDER — IBUPROFEN 400 MG/1
400 TABLET ORAL EVERY 8 HOURS PRN
Status: DISCONTINUED | OUTPATIENT
Start: 2023-08-25 | End: 2023-08-27

## 2023-08-25 RX ORDER — CLINDAMYCIN PHOSPHATE 150 MG/ML
INJECTION, SOLUTION INTRAVENOUS AS NEEDED
Status: DISCONTINUED | OUTPATIENT
Start: 2023-08-25 | End: 2023-08-25 | Stop reason: SURG

## 2023-08-25 RX ORDER — FAMOTIDINE 20 MG/1
20 TABLET, FILM COATED ORAL ONCE
Status: COMPLETED | OUTPATIENT
Start: 2023-08-25 | End: 2023-08-25

## 2023-08-25 RX ORDER — BUPIVACAINE HYDROCHLORIDE 7.5 MG/ML
INJECTION, SOLUTION INTRASPINAL
Status: COMPLETED | OUTPATIENT
Start: 2023-08-25 | End: 2023-08-25

## 2023-08-25 RX ORDER — ACETAMINOPHEN 325 MG/1
650 TABLET ORAL EVERY 8 HOURS PRN
Status: DISCONTINUED | OUTPATIENT
Start: 2023-08-26 | End: 2023-08-27

## 2023-08-25 RX ORDER — HYDROMORPHONE HYDROCHLORIDE 1 MG/ML
0.4 INJECTION, SOLUTION INTRAMUSCULAR; INTRAVENOUS; SUBCUTANEOUS
Status: ACTIVE | OUTPATIENT
Start: 2023-08-25 | End: 2023-08-26

## 2023-08-25 RX ORDER — CALCIUM CARBONATE-CHOLECALCIFEROL TAB 250 MG-125 UNIT 250-125 MG-UNIT
1 TAB ORAL 2 TIMES DAILY WITH MEALS
Status: DISCONTINUED | OUTPATIENT
Start: 2023-08-25 | End: 2023-08-27

## 2023-08-25 RX ORDER — DIPHENHYDRAMINE HYDROCHLORIDE 50 MG/ML
12.5 INJECTION INTRAMUSCULAR; INTRAVENOUS EVERY 4 HOURS PRN
Status: DISCONTINUED | OUTPATIENT
Start: 2023-08-25 | End: 2023-08-27

## 2023-08-25 RX ORDER — DIPHENHYDRAMINE HCL 25 MG
25 CAPSULE ORAL EVERY 4 HOURS PRN
Status: DISCONTINUED | OUTPATIENT
Start: 2023-08-25 | End: 2023-08-27

## 2023-08-25 RX ORDER — CALCIUM CARBONATE-CHOLECALCIFEROL TAB 250 MG-125 UNIT 250-125 MG-UNIT
1 TAB ORAL 2 TIMES DAILY WITH MEALS
Qty: 60 TABLET | Refills: 0 | Status: SHIPPED | OUTPATIENT
Start: 2023-08-26

## 2023-08-25 RX ORDER — LIDOCAINE HYDROCHLORIDE 10 MG/ML
INJECTION, SOLUTION INFILTRATION; PERINEURAL
Status: COMPLETED | OUTPATIENT
Start: 2023-08-25 | End: 2023-08-25

## 2023-08-25 RX ADMIN — EPHEDRINE SULFATE 10 MG: 50 INJECTION INTRAVENOUS at 12:53:00

## 2023-08-25 RX ADMIN — PHENYLEPHRINE HCL 100 MCG: 10 MG/ML VIAL (ML) INJECTION at 13:48:00

## 2023-08-25 RX ADMIN — BUPIVACAINE HYDROCHLORIDE 1.4 ML: 7.5 INJECTION, SOLUTION INTRASPINAL at 12:02:00

## 2023-08-25 RX ADMIN — PHENYLEPHRINE HCL 100 MCG: 10 MG/ML VIAL (ML) INJECTION at 12:45:00

## 2023-08-25 RX ADMIN — PHENYLEPHRINE HCL 100 MCG: 10 MG/ML VIAL (ML) INJECTION at 13:05:00

## 2023-08-25 RX ADMIN — PHENYLEPHRINE HCL 100 MCG: 10 MG/ML VIAL (ML) INJECTION at 14:14:00

## 2023-08-25 RX ADMIN — SODIUM CHLORIDE, SODIUM LACTATE, POTASSIUM CHLORIDE, CALCIUM CHLORIDE: 600; 310; 30; 20 INJECTION, SOLUTION INTRAVENOUS at 11:50:00

## 2023-08-25 RX ADMIN — MORPHINE SULFATE 0.2 MG: 1 INJECTION, SOLUTION EPIDURAL; INTRATHECAL; INTRAVENOUS at 12:02:00

## 2023-08-25 RX ADMIN — SODIUM CHLORIDE, SODIUM LACTATE, POTASSIUM CHLORIDE, CALCIUM CHLORIDE: 600; 310; 30; 20 INJECTION, SOLUTION INTRAVENOUS at 12:15:00

## 2023-08-25 RX ADMIN — CLINDAMYCIN PHOSPHATE 900 MG: 150 INJECTION, SOLUTION INTRAVENOUS at 12:27:00

## 2023-08-25 RX ADMIN — TRANEXAMIC ACID 1000 MG: 10 INJECTION, SOLUTION INTRAVENOUS at 13:39:00

## 2023-08-25 RX ADMIN — TRANEXAMIC ACID 1000 MG: 10 INJECTION, SOLUTION INTRAVENOUS at 12:09:00

## 2023-08-25 RX ADMIN — PHENYLEPHRINE HCL 100 MCG: 10 MG/ML VIAL (ML) INJECTION at 13:18:00

## 2023-08-25 RX ADMIN — PHENYLEPHRINE HCL 50 MCG: 10 MG/ML VIAL (ML) INJECTION at 12:31:00

## 2023-08-25 RX ADMIN — LIDOCAINE HYDROCHLORIDE 5 ML: 10 INJECTION, SOLUTION INFILTRATION; PERINEURAL at 11:55:00

## 2023-08-25 RX ADMIN — MIDAZOLAM HYDROCHLORIDE 2 MG: 1 INJECTION INTRAMUSCULAR; INTRAVENOUS at 11:53:00

## 2023-08-25 RX ADMIN — ONDANSETRON 4 MG: 2 INJECTION INTRAMUSCULAR; INTRAVENOUS at 11:53:00

## 2023-08-25 RX ADMIN — VANCOMYCIN HYDROCHLORIDE 1.5 G: at 11:50:00

## 2023-08-25 NOTE — BRIEF OP NOTE
Pre-Operative Diagnosis: 1) right knee osteoarthritis, 2) obesity BMI 36.81     Post-Operative Diagnosis: 1) right knee osteoarthritis, 2) obesity BMI 36.81     Procedure Performed:   Right total knee arthroplasty with MRI templated patient specific instruments    Surgeon(s) and Role:     * Lilli Gabriel MD - Primary    Assistant(s):  Surgical Assistant.: Merry Wolff, CSA; [place], [id number] [address]    Anesthesia: Dr. Deepa Hernandez with spinal using Duramorph/MAC     Surgical Findings: Haroon persona LPS femur 9 right narrow, tibia E right with short stem extension, patella 41 x 10.0 mm, polyethylene 12 mm LPS Vivacit-E. Tourniquet 64 minutes at 300 mmHg. Complications: None  Specimen: Bone cuts to pathology  Drains: None  Estimated Blood Loss: 25 cc  Stable to PACU. Tranexamic acid 1 g IV second dose given in OR at end of case.     Ritchie Gottron, MD  8/25/2023  12:12 PM

## 2023-08-25 NOTE — INTERVAL H&P NOTE
Pre-op Diagnosis: Right knee osteoarthritis, obesity    The above referenced H&P was reviewed by Nav Painter MD on 8/25/2023, the patient was examined and no significant changes have occurred in the patient's condition since the H&P was performed. I discussed with the patient and/or legal representative the potential benefits, risks and side effects of this procedure; the likelihood of the patient achieving goals; and potential problems that might occur during recuperation. I discussed reasonable alternatives to the procedure, including risks, benefits and side effects related to the alternatives and risks related to not receiving this procedure. We will proceed with procedure as planned.

## 2023-08-25 NOTE — ANESTHESIA PROCEDURE NOTES
Spinal Block    Date/Time: 8/25/2023 11:55 AM    Performed by: Kaitlyn Blackburn MD  Authorized by: Cristhian Chapin MD      General Information and Staff    Start Time:  8/25/2023 11:55 AM  End Time:  8/25/2023 12:02 PM  Anesthesiologist:  Cristhian Chapin MD  Performed by:   Anesthesiologist  Patient Location:  OR  Site identification: surface landmarks    Reason for Block: at surgeon's request, post-op pain management and surgical anesthesia    Preanesthetic Checklist: patient identified, IV checked, risks and benefits discussed, monitors and equipment checked, pre-op evaluation, timeout performed, anesthesia consent and sterile technique used      Procedure Details    Patient Position:  Sitting  Prep: ChloraPrep    Monitoring:  Cardiac monitor  Approach:  Midline  Location:  L3-4  Injection Technique:  Single-shot    Needle    Needle Type:  Pencil-tip  Needle Gauge:  24 G  Needle Length:  3.5 in    Assessment    Sensory Level:  T8  Events: clear CSF, CSF aspirated, well tolerated and blood negative      Additional Comments

## 2023-08-25 NOTE — OPERATIVE REPORT
[facility]    PATIENT'S NAME: Linda Arreguin   ATTENDING PHYSICIAN: [address] MIRZA Jimenez MD   OPERATING PHYSICIAN: Raffy Jimenez MD   PATIENT ACCOUNT#:   [id number]    LOCATION:  [address] [place] [address] Drive RECORD #:   [id number]       YOB: 1945  ADMISSION DATE:       08/25/2023      OPERATION DATE:  08/25/2023    OPERATIVE REPORT      PREOPERATIVE DIAGNOSIS:    1. Right knee osteoarthritis. 2.   Obesity, BMI 36.1. POSTOPERATIVE DIAGNOSIS:    1. Right knee osteoarthritis. 2.   Obesity, BMI 36.1. PROCEDURE:  Right total knee arthroplasty with MRI templated patient-specific instruments. ASSISTANT:  Scott Nath CSA; [address], [place]    ANESTHESIA:  Dr. Becky Jeffery, with spinal using Duramorph/MAC. INDICATIONS:  Patient is a 70-year-old woman with the above d diagnoses documented by physical exam, x-ray, and MRI. She has failed nonoperative treatment. The risks and complications of surgery were discussed and no guarantees were given. The consent was signed. Due to her obesity code with a BMI of 36.81, MRI templating as well as 2 surgical assistants were all medically necessary. In a woman with a larger BMI, the 2 surgical assistants provided safe transfer to the operative table, help with the tourniquet and prep, placement of drains, and most importantly, the necessary retraction in a larger person for the proper implantation of the components. They assisted in deep closure, superficial closure, dressings, and then safe transfer to the recovery room. MRI templating provided the proper alignment of the components without having to struggle with standard instrumentation in a larger person. Without the MRI templating as well as 2 surgical assistants, the surgery would have been far more difficult and less safe and therefore, they were all medically necessary. OPERATIVE TECHNIQUE:  Patient was brought to the operating room and placed in supine position. Appropriate IV access and monitors were placed. Vancomycin 1.5 g was being given but was not going to be completed before the tourniquet. Clindamycin 900 mg IV was also given as a prophylaxis. Tranexamic acid 1 g IV was given before the tourniquet was inflated as well. Spinal with Duramorph was performed by Dr. Flynn Miller, followed by monitored sedation. SCD boot was on the left leg. The right lower extremity was then prepped and draped in sterile fashion with Betadine followed by ChloraPrep. The leg was exsanguinated and the tourniquet inflated to 300 mmHg. Tourniquet time for the case was 63 minutes. Incision was made and a midvastus snip was used to gain access to the knee. Tricompartmental osteoarthritis with effusion was noted. Much of the fat pad was excised. The patella was resected from 24 mm to 14 mm. A 41 mm patella fit optimally and the 3 drill holes were placed. A protector was placed and the patella tucked in the lateral gutter. The knee was flexed and the anterior menisci and cruciate ligaments were all excised. The patient-specific guide was applied and fit well and easily. Pins were placed and the distal femoral cuts were made as templated. Size 9 cutting block was applied as templated. Proper external rotation was noted. The anterior cut had no notch. The posterior cuts were made as templated. The chamfer cuts were finished and some osteophytes were removed medially and laterally as well as posteriorly. Two large loose bodies of bone also extruded from the posterior capsule. The rest of the menisci were excised and the patient-specific guide was applied to the proximal tibia. The drop ely was in line with the anterior tibial spine with proper posterior slope. Medial and lateral cuts were made as templated. Size E tibia fit well as templated. The F was too big. Spacer block showed good balance in flexion and extension.   LPS notch was cut for the femur and trial reduction showed a 12 mm spacer to fit optimally in both extension and flexion with full deep flexion and no liftoff. There was good rollback on flexion. The patella was tracking well. The collateral ligaments were balanced at 0 degrees, 40 degrees, and 100 degrees. The tibia was nailed in its proper rotation. The pegs were drilled for the femur and the stem prepared for the tibia. Two batches of Haroon gentamicin premixed methylmethacrylate cement were prepared in a vacuum container. The bone surfaces were cleansed thoroughly using saline and a Pulsavac lavage. They were dried thoroughly. The cement was placed in the back of the components and pressurized into the bone. Tibia followed by femur followed by patella were cemented in standard fashion. A spacer was placed and the knee brought into extension. An assistant held axial pressure while excess cement was removed and the cement hardened. During the last 4 minutes of cement hardening, a dilute Betadine solution was placed in the knee. This was evacuated when the cement hardened. Components used were Haroon Persona LPS femur 9 right narrow, tibia E right with short stem extension, patella 41 x 10.0 mm, polyethylene 12 mm LPS Vivacit-E. The posterior knee was inspected for any excess cement and this was removed. Trial reduction showed a 12 mm spacer fit better than 13 mm. Again, the knee had excellent motion, balance, tracking, and rollback. The 12 mm LPS Vivacit-E polyethylene was snapped into position and again excellent parameters of motion and stability were noted. The tourniquet was let down and the knee was closed in flexion in layers eventually with Monocryl subcuticular and Exofin glue. Tranexamic acid 1 g IV second dose was given after the tourniquet had been down for about 5 minutes. Silver Mepilex, sterile gauze, sterile Webril, towel, ice pack, and Ace wrap completed the dressing. This is all done in flexion.     Blood loss was 25 mL. The specimens were the bone cuts to Pathology. There were no drains and no complications. The patient tolerated procedure well. Dictated By Nemo Driscoll. Ovidio Pickett MD  d: 08/25/2023 14:08:13  t: 08/25/2023 17:32:27  [facility] [id number]  Formerly [facility]/    cc: Marjan Leos.  Lilibeth Farias MD

## 2023-08-25 NOTE — PLAN OF CARE
Patient has been resting in bed w/ call light within reach. Currently has mild pain. Had an episode of emesis this afternoon, IV zofran given. Is a check void this evening. Is on aspirin and has SCDs while in bed for dvt prophylaxis. DC plan pending. Problem: Patient Centered Care  Goal: Patient preferences are identified and integrated in the patient's plan of care  Description: Interventions:  - What would you like us to know as we care for you?  I live with my son  - Provide timely, complete, and accurate information to patient/family  - Incorporate patient and family knowledge, values, beliefs, and cultural backgrounds into the planning and delivery of care  - Encourage patient/family to participate in care and decision-making at the level they choose  - Honor patient and family perspectives and choices  Outcome: Progressing     Problem: Patient/Family Goals  Goal: Patient/Family Long Term Goal  Description: Patient's Long Term Goal: To keep pain level <5    Interventions:  - Pain medications  - ambulation  - walker   - pt/ot   - See additional Care Plan goals for specific interventions  Outcome: Progressing  Goal: Patient/Family Short Term Goal  Description: Patient's Short Term Goal: to go home     Interventions:   - Follow plan of care  - See additional Care Plan goals for specific interventions  Outcome: Progressing     Problem: PAIN - ADULT  Goal: Verbalizes/displays adequate comfort level or patient's stated pain goal  Description: INTERVENTIONS:  - Encourage pt to monitor pain and request assistance  - Assess pain using appropriate pain scale  - Administer analgesics based on type and severity of pain and evaluate response  - Implement non-pharmacological measures as appropriate and evaluate response  - Consider cultural and social influences on pain and pain management  - Manage/alleviate anxiety  - Utilize distraction and/or relaxation techniques  - Monitor for opioid side effects  - Notify MD/LIP if interventions unsuccessful or patient reports new pain  - Anticipate increased pain with activity and pre-medicate as appropriate  Outcome: Progressing     Problem: RISK FOR INFECTION - ADULT  Goal: Absence of fever/infection during anticipated neutropenic period  Description: INTERVENTIONS  - Monitor WBC  - Administer growth factors as ordered  - Implement neutropenic guidelines  Outcome: Progressing     Problem: SAFETY ADULT - FALL  Goal: Free from fall injury  Description: INTERVENTIONS:  - Assess pt frequently for physical needs  - Identify cognitive and physical deficits and behaviors that affect risk of falls.   - [place] fall precautions as indicated by assessment.  - Educate pt/family on patient safety including physical limitations  - Instruct pt to call for assistance with activity based on assessment  - Modify environment to reduce risk of injury  - Provide assistive devices as appropriate  - Consider OT/PT consult to assist with strengthening/mobility  - Encourage toileting schedule  Outcome: Progressing     Problem: DISCHARGE PLANNING  Goal: Discharge to home or other facility with appropriate resources  Description: INTERVENTIONS:  - Identify barriers to discharge w/pt and caregiver  - Include patient/family/discharge partner in discharge planning  - Arrange for needed discharge resources and transportation as appropriate  - Identify discharge learning needs (meds, wound care, etc)  - Arrange for interpreters to assist at discharge as needed  - Consider post-discharge preferences of patient/family/discharge partner  - Complete POLST form as appropriate  - Assess patient's ability to be responsible for managing their own health  - Refer to Case Management Department for coordinating discharge planning if the patient needs post-hospital services based on physician/LIP order or complex needs related to functional status, cognitive ability or social support system  Outcome: Progressing

## 2023-08-26 LAB
ANION GAP SERPL CALC-SCNC: 6 MMOL/L (ref 0–18)
BUN BLD-MCNC: 14 MG/DL (ref 7–18)
BUN/CREAT SERPL: 18.9 (ref 10–20)
CALCIUM BLD-MCNC: 8.5 MG/DL (ref 8.5–10.1)
CHLORIDE SERPL-SCNC: 107 MMOL/L (ref 98–112)
CO2 SERPL-SCNC: 27 MMOL/L (ref 21–32)
CREAT BLD-MCNC: 0.74 MG/DL
EGFRCR SERPLBLD CKD-EPI 2021: 84 ML/MIN/1.73M2 (ref 60–?)
GLUCOSE BLD-MCNC: 128 MG/DL (ref 70–99)
HCT VFR BLD AUTO: 34.7 %
HGB BLD-MCNC: 11.6 G/DL
OSMOLALITY SERPL CALC.SUM OF ELEC: 292 MOSM/KG (ref 275–295)
POTASSIUM SERPL-SCNC: 4.5 MMOL/L (ref 3.5–5.1)
SODIUM SERPL-SCNC: 140 MMOL/L (ref 136–145)

## 2023-08-26 PROCEDURE — 99233 SBSQ HOSP IP/OBS HIGH 50: CPT | Performed by: INTERNAL MEDICINE

## 2023-08-26 NOTE — PLAN OF CARE
Patient is POD 1 Aox4 on RA. Complaints of dizziness. SCDs and ASA. Lovenox one time dose given. 1x assist w/ walker SPT to RC. Voiding up to the bathroom. Patient had one episode of emesis overnight. IV Zofran given. IVF infusing. Norco given for pain. Gel ice. Plan for PT/OT.       Problem: Patient Centered Care  Goal: Patient preferences are identified and integrated in the patient's plan of care  Description: Interventions:  - What would you like us to know as we care for you?   - Provide timely, complete, and accurate information to patient/family  - Incorporate patient and family knowledge, values, beliefs, and cultural backgrounds into the planning and delivery of care  - Encourage patient/family to participate in care and decision-making at the level they choose  - Honor patient and family perspectives and choices  Outcome: Progressing     Problem: PAIN - ADULT  Goal: Verbalizes/displays adequate comfort level or patient's stated pain goal  Description: INTERVENTIONS:  - Encourage pt to monitor pain and request assistance  - Assess pain using appropriate pain scale  - Administer analgesics based on type and severity of pain and evaluate response  - Implement non-pharmacological measures as appropriate and evaluate response  - Consider cultural and social influences on pain and pain management  - Manage/alleviate anxiety  - Utilize distraction and/or relaxation techniques  - Monitor for opioid side effects  - Notify MD/LIP if interventions unsuccessful or patient reports new pain  - Anticipate increased pain with activity and pre-medicate as appropriate  Outcome: Progressing     Problem: RISK FOR INFECTION - ADULT  Goal: Absence of fever/infection during anticipated neutropenic period  Description: INTERVENTIONS  - Monitor WBC  - Administer growth factors as ordered  - Implement neutropenic guidelines  Outcome: Progressing     Problem: SAFETY ADULT - FALL  Goal: Free from fall injury  Description: INTERVENTIONS:  - Assess pt frequently for physical needs  - Identify cognitive and physical deficits and behaviors that affect risk of falls.   - [place] fall precautions as indicated by assessment.  - Educate pt/family on patient safety including physical limitations  - Instruct pt to call for assistance with activity based on assessment  - Modify environment to reduce risk of injury  - Provide assistive devices as appropriate  - Consider OT/PT consult to assist with strengthening/mobility  - Encourage toileting schedule  Outcome: Progressing     Problem: DISCHARGE PLANNING  Goal: Discharge to home or other facility with appropriate resources  Description: INTERVENTIONS:  - Identify barriers to discharge w/pt and caregiver  - Include patient/family/discharge partner in discharge planning  - Arrange for needed discharge resources and transportation as appropriate  - Identify discharge learning needs (meds, wound care, etc)  - Arrange for interpreters to assist at discharge as needed  - Consider post-discharge preferences of patient/family/discharge partner  - Complete POLST form as appropriate  - Assess patient's ability to be responsible for managing their own health  - Refer to Case Management Department for coordinating discharge planning if the patient needs post-hospital services based on physician/LIP order or complex needs related to functional status, cognitive ability or social support system  Outcome: Progressing

## 2023-08-26 NOTE — PLAN OF CARE
Mirian Sullivan is from home. Alert and oriented x 4 . Pod 1 R TKR. CMS wnl, sx drsg de-bulked today per jessica sands-mepilex in place c/d/I-afebrile with no s/s infection. Karen diet, voiding, lbm 8/25, asa/scd, gel wrap/norco for pain management. Oob with one assist and BR-prut-exdyukrmr in room/[place] today-possible home tomorrow with [facility]-sw to f/u regarding accepting [facility] agency  Problem: Patient Centered Care  Goal: Patient preferences are identified and integrated in the patient's plan of care  Description: Interventions:  - What would you like us to know as we care for you?  I am retired nurse  - Provide timely, complete, and accurate information to patient/family  - Incorporate patient and family knowledge, values, beliefs, and cultural backgrounds into the planning and delivery of care  - Encourage patient/family to participate in care and decision-making at the level they choose  - Honor patient and family perspectives and choices  Outcome: Progressing     Problem: Patient/Family Goals  Goal: Patient/Family Long Term Goal  Description: Patient's Long Term Goal: return to baseline adls    Interventions:  - pt/ot  Sw dc planning  Pain control  - See additional Care Plan goals for specific interventions  Outcome: Progressing  Goal: Patient/Family Short Term Goal  Description: Patient's Short Term Goal: home tomorrow    Interventions:   - pain well controlled  Karen diet  Vs/labs wnl  Cleared pt/ot  - See additional Care Plan goals for specific interventions  Outcome: Progressing     Problem: PAIN - ADULT  Goal: Verbalizes/displays adequate comfort level or patient's stated pain goal  Description: INTERVENTIONS:  - Encourage pt to monitor pain and request assistance  - Assess pain using appropriate pain scale  - Administer analgesics based on type and severity of pain and evaluate response  - Implement non-pharmacological measures as appropriate and evaluate response  - Consider cultural and social influences on pain and pain management  - Manage/alleviate anxiety  - Utilize distraction and/or relaxation techniques  - Monitor for opioid side effects  - Notify MD/LIP if interventions unsuccessful or patient reports new pain  - Anticipate increased pain with activity and pre-medicate as appropriate  Outcome: Progressing     Problem: RISK FOR INFECTION - ADULT  Goal: Absence of fever/infection during anticipated neutropenic period  Description: INTERVENTIONS  - Monitor WBC  - Administer growth factors as ordered  - Implement neutropenic guidelines  Outcome: Progressing     Problem: SAFETY ADULT - FALL  Goal: Free from fall injury  Description: INTERVENTIONS:  - Assess pt frequently for physical needs  - Identify cognitive and physical deficits and behaviors that affect risk of falls.   - [place] fall precautions as indicated by assessment.  - Educate pt/family on patient safety including physical limitations  - Instruct pt to call for assistance with activity based on assessment  - Modify environment to reduce risk of injury  - Provide assistive devices as appropriate  - Consider OT/PT consult to assist with strengthening/mobility  - Encourage toileting schedule  Outcome: Progressing     Problem: DISCHARGE PLANNING  Goal: Discharge to home or other facility with appropriate resources  Description: INTERVENTIONS:  - Identify barriers to discharge w/pt and caregiver  - Include patient/family/discharge partner in discharge planning  - Arrange for needed discharge resources and transportation as appropriate  - Identify discharge learning needs (meds, wound care, etc)  - Arrange for interpreters to assist at discharge as needed  - Consider post-discharge preferences of patient/family/discharge partner  - Complete POLST form as appropriate  - Assess patient's ability to be responsible for managing their own health  - Refer to Case Management Department for coordinating discharge planning if the patient needs post-hospital services based on physician/LIP order or complex needs related to functional status, cognitive ability or social support system  Outcome: Progressing

## 2023-08-26 NOTE — ANESTHESIA POST-OP FOLLOW-UP NOTE
Pt s/p TKA w/ spinal a with duramorph. POD 1. PT doing well; no complains. Pain well controlled.    Pt ambulating

## 2023-08-26 NOTE — PHYSICAL THERAPY NOTE
PHYSICAL THERAPY KNEE EVALUATION - INPATIENT       Room Number: 407/407-A  Evaluation Date: 8/26/2023  Type of Evaluation: Initial       Presenting Problem: s/p R TKA     Reason for Therapy: Mobility Dysfunction and Discharge Planning    PHYSICAL THERAPY ASSESSMENT     Patient is a 70year old female admitted 8/25/2023 for R TKA. Patient cleared for therapy by RN. Patient reports dizziness with lying down and sitting up. She reports the room feels as if it is spinning. Patient has been increasing the elevation of the [address] gradually to acclimate herself to a more upright posture. When assessed, patient did not demonstrate any vestibular system deficits. She did not have any true vertigo, but reported intermittent light headedness. Patient's current functional deficits include decreased ROM, strength, balance, activity tolerance and dizziness, which are below the patient's pre-admission status. Bed Mobility:stand by assistance to move supine to sit. Transfers: sit to stand: contact guard assistance  Ambulation: 15', then 120' with RW and contact guard assistance. Verbal cues for correct sequencing. The patient's Approx Degree of Impairment: 46.58% has been calculated based on documentation in the [facility] '6 clicks' Inpatient Basic Mobility Short Form. Research supports that patients with this level of impairment may benefit from returning home with HHPT. Patient will benefit from continued IP PT services to address these deficits in preparation for discharge. DISCHARGE RECOMMENDATIONS  PT Discharge Recommendations: Home with home health PT    PLAN  PT Treatment Plan: Bed mobility; Patient education;Gait training;Stair training;Transfer training;Balance training;Range of motion;Strengthening  Rehab Potential : Good  Frequency (Obs): Daily       PHYSICAL THERAPY MEDICAL/SOCIAL HISTORY         Problem List  Principal Problem:    Primary osteoarthritis of right knee  Active Problems:    Essential hypertension    Preop testing    Asthma      HOME SITUATION  Home Situation  Type of Home: Condo  Home Layout: Elevator  Lives With: Son  Patient Owned Equipment: Rolling walker     Prior Level of [place]: patient travelled [facility] recently, but did not note if she required an assisted device. SUBJECTIVE  Patient was motivated and wanted to work with therapy    PHYSICAL THERAPY EXAMINATION     OBJECTIVE          WEIGHT BEARING RESTRICTION        R Lower Extremity: Weight Bearing as Tolerated       PAIN ASSESSMENT  Ratin          COGNITION  Overall Cognitive Status:  WFL - within functional limits    RANGE OF MOTION AND STRENGTH ASSESSMENT  Upper extremity ROM and strength are within functional limits   :eft Lower extremity ROM is within functional limits   Left Lower extremity strength is within functional limits     BALANCE  Static Sitting: Good  Dynamic Sitting: Good  Static Standing: Fair -  Dynamic Standing: Fair -      ACTIVITY TOLERANCE  Pulse: 90  Heart Rate Source: Monitor     BP: 143/76  BP Location: Left arm  BP Method: Automatic  Patient Position: Lying    O2 WALK  Oxygen Therapy  SPO2% on Room Air at Rest: 98    AM-PAC '6-Clicks' INPATIENT SHORT FORM - BASIC MOBILITY  How much difficulty does the patient currently have. .. Patient Difficulty: Turning over in bed (including adjusting bedclothes, sheets and blankets)?: A Little   Patient Difficulty: Sitting down on and standing up from a chair with arms (e.g., wheelchair, bedside commode, etc.): A Little   Patient Difficulty: Moving from lying on back to sitting on the side of the bed?: A Little   How much help from another person does the patient currently need. ..    Help from Another: Moving to and from a bed to a chair (including a wheelchair)?: A Little   Help from Another: Need to walk in hospital room?: A Little   Help from Another: Climbing 3-5 steps with a railing?: A Little     AM-PAC Score:  Raw Score: 18   Approx Degree of Impairment: 46.58%   Standardized Score (AM-PAC Scale): 43.63   CMS Modifier (G-Code): CK      Exercise/Education Provided:  Bed mobility  Functional activity tolerated  Gait training  Lower therapeutic exercise: Ankle pumps  Glut sets  Quad sets  Transfer training    Patient End of Session: Up in chair;Needs met;Call light within reach;RN aware of session/findings    CURRENT GOALS    Goals to be met by: 9/9/23  Patient Goal Patient's self-stated goal is: to go home   Goal #1 Patient is able to demonstrate supine - sit EOB @ level: modified independent     Goal #1   Current Status    Goal #2 Patient is able to demonstrate transfers Sit to/from Stand at assistance level: modified independent     Goal #2  Current Status    Goal #3 Patient is able to ambulate 300 feet with assistive device at assistance level: modified independent    Goal #3   Current Status    Goal #4 Patient will negotiate one curb w/ assistive device and supervision   Goal #4   Current Status    Goal #5  AROM 0 degrees extension to 95 degrees flexion     Goal #5   Current Status    Goal #6 Patient independently performs home exercise program for ROM/strengthening per the instructions provided in preparation for discharge.    Goal #6  Current Status    Patient Evaluation Complexity Level:  History Low - no personal factors and/or co-morbidities   Examination of body systems Low - addressing 1-2 elements   Clinical Presentation Low - Stable   Clinical Decision Making Low Complexity       Gait Training: 15 minutes    Therapeutic Exercise: 10 minutes

## 2023-08-26 NOTE — CM/SW NOTE
Department  notified of request for [facility], aidin referrals started. Assigned CM/SW to follow up with pt/family on further discharge planning.      Wojciech Rodriguez   August 26, 2023   09:36

## 2023-08-27 VITALS
HEART RATE: 93 BPM | TEMPERATURE: 99 F | WEIGHT: 235 LBS | SYSTOLIC BLOOD PRESSURE: 139 MMHG | DIASTOLIC BLOOD PRESSURE: 79 MMHG | RESPIRATION RATE: 16 BRPM | BODY MASS INDEX: 36.88 KG/M2 | HEIGHT: 67 IN | OXYGEN SATURATION: 96 %

## 2023-08-27 LAB
ALBUMIN SERPL-MCNC: 3 G/DL (ref 3.4–5)
ALBUMIN/GLOB SERPL: 1 {RATIO} (ref 1–2)
ALP LIVER SERPL-CCNC: 57 U/L
ALT SERPL-CCNC: 20 U/L
ANION GAP SERPL CALC-SCNC: 5 MMOL/L (ref 0–18)
AST SERPL-CCNC: 19 U/L (ref 15–37)
BASOPHILS # BLD AUTO: 0.04 X10(3) UL (ref 0–0.2)
BASOPHILS NFR BLD AUTO: 0.4 %
BILIRUB SERPL-MCNC: 0.7 MG/DL (ref 0.1–2)
BUN BLD-MCNC: 13 MG/DL (ref 7–18)
BUN/CREAT SERPL: 21.3 (ref 10–20)
CALCIUM BLD-MCNC: 8.6 MG/DL (ref 8.5–10.1)
CHLORIDE SERPL-SCNC: 105 MMOL/L (ref 98–112)
CO2 SERPL-SCNC: 26 MMOL/L (ref 21–32)
CREAT BLD-MCNC: 0.61 MG/DL
DEPRECATED RDW RBC AUTO: 42.8 FL (ref 35.1–46.3)
EGFRCR SERPLBLD CKD-EPI 2021: 93 ML/MIN/1.73M2 (ref 60–?)
EOSINOPHIL # BLD AUTO: 0.01 X10(3) UL (ref 0–0.7)
EOSINOPHIL NFR BLD AUTO: 0.1 %
ERYTHROCYTE [DISTWIDTH] IN BLOOD BY AUTOMATED COUNT: 12 % (ref 11–15)
GLOBULIN PLAS-MCNC: 3 G/DL (ref 2.8–4.4)
GLUCOSE BLD-MCNC: 128 MG/DL (ref 70–99)
HCT VFR BLD AUTO: 30.7 %
HGB BLD-MCNC: 10.2 G/DL
IMM GRANULOCYTES # BLD AUTO: 0.03 X10(3) UL (ref 0–1)
IMM GRANULOCYTES NFR BLD: 0.3 %
LYMPHOCYTES # BLD AUTO: 1.28 X10(3) UL (ref 1–4)
LYMPHOCYTES NFR BLD AUTO: 14.2 %
MCH RBC QN AUTO: 32.3 PG (ref 26–34)
MCHC RBC AUTO-ENTMCNC: 33.2 G/DL (ref 31–37)
MCV RBC AUTO: 97.2 FL
MONOCYTES # BLD AUTO: 1.19 X10(3) UL (ref 0.1–1)
MONOCYTES NFR BLD AUTO: 13.2 %
NEUTROPHILS # BLD AUTO: 6.44 X10 (3) UL (ref 1.5–7.7)
NEUTROPHILS # BLD AUTO: 6.44 X10(3) UL (ref 1.5–7.7)
NEUTROPHILS NFR BLD AUTO: 71.8 %
OSMOLALITY SERPL CALC.SUM OF ELEC: 284 MOSM/KG (ref 275–295)
PLATELET # BLD AUTO: 240 10(3)UL (ref 150–450)
POTASSIUM SERPL-SCNC: 4 MMOL/L (ref 3.5–5.1)
PROT SERPL-MCNC: 6 G/DL (ref 6.4–8.2)
RBC # BLD AUTO: 3.16 X10(6)UL
SODIUM SERPL-SCNC: 136 MMOL/L (ref 136–145)
WBC # BLD AUTO: 9 X10(3) UL (ref 4–11)

## 2023-08-27 PROCEDURE — 99233 SBSQ HOSP IP/OBS HIGH 50: CPT | Performed by: INTERNAL MEDICINE

## 2023-08-27 NOTE — HOME CARE LIAISON
Referral received from SW/CM team via [employer]. Discussed with patient the recommendation for home health services, patient agreeable, and all questions answered. Updated Finesse Pascual.

## 2023-08-27 NOTE — PLAN OF CARE
Alert and oriented x4. Right total knee. POD 1. Voiding. 1 assist walker. Bilateral mastectomy. Preferred BP left side. SCD and aspirin. Norco for pain. BM on the 25th. Gel ice. Incision debulked just a mepilex. Plan for home with home health. Problem: Patient Centered Care  Goal: Patient preferences are identified and integrated in the patient's plan of care  Description: Interventions:  - What would you like us to know as we care for you?  Home with son  - Provide timely, complete, and accurate information to patient/family  - Incorporate patient and family knowledge, values, beliefs, and cultural backgrounds into the planning and delivery of care  - Encourage patient/family to participate in care and decision-making at the level they choose  - Honor patient and family perspectives and choices  Outcome: Progressing     Problem: Patient/Family Goals  Goal: Patient/Family Long Term Goal  Description: Patient's Long Term Goal: pain management    Interventions:  - pain administration  - See additional Care Plan goals for specific interventions  Outcome: Progressing  Goal: Patient/Family Short Term Goal  Description: Patient's Short Term Goal: calling for assistance out of bed    Interventions:   - safe ambulation  - See additional Care Plan goals for specific interventions  Outcome: Progressing     Problem: PAIN - ADULT  Goal: Verbalizes/displays adequate comfort level or patient's stated pain goal  Description: INTERVENTIONS:  - Encourage pt to monitor pain and request assistance  - Assess pain using appropriate pain scale  - Administer analgesics based on type and severity of pain and evaluate response  - Implement non-pharmacological measures as appropriate and evaluate response  - Consider cultural and social influences on pain and pain management  - Manage/alleviate anxiety  - Utilize distraction and/or relaxation techniques  - Monitor for opioid side effects  - Notify MD/LIP if interventions unsuccessful or patient reports new pain  - Anticipate increased pain with activity and pre-medicate as appropriate  Outcome: Progressing     Problem: RISK FOR INFECTION - ADULT  Goal: Absence of fever/infection during anticipated neutropenic period  Description: INTERVENTIONS  - Monitor WBC  - Administer growth factors as ordered  - Implement neutropenic guidelines  Outcome: Progressing     Problem: SAFETY ADULT - FALL  Goal: Free from fall injury  Description: INTERVENTIONS:  - Assess pt frequently for physical needs  - Identify cognitive and physical deficits and behaviors that affect risk of falls.   - [place] fall precautions as indicated by assessment.  - Educate pt/family on patient safety including physical limitations  - Instruct pt to call for assistance with activity based on assessment  - Modify environment to reduce risk of injury  - Provide assistive devices as appropriate  - Consider OT/PT consult to assist with strengthening/mobility  - Encourage toileting schedule  Outcome: Progressing     Problem: DISCHARGE PLANNING  Goal: Discharge to home or other facility with appropriate resources  Description: INTERVENTIONS:  - Identify barriers to discharge w/pt and caregiver  - Include patient/family/discharge partner in discharge planning  - Arrange for needed discharge resources and transportation as appropriate  - Identify discharge learning needs (meds, wound care, etc)  - Arrange for interpreters to assist at discharge as needed  - Consider post-discharge preferences of patient/family/discharge partner  - Complete POLST form as appropriate  - Assess patient's ability to be responsible for managing their own health  - Refer to Case Management Department for coordinating discharge planning if the patient needs post-hospital services based on physician/LIP order or complex needs related to functional status, cognitive ability or social support system  Outcome: Progressing

## 2023-08-27 NOTE — PLAN OF CARE
Gabriel Gomez is from home with family. Alert and oriented x 4 . Pod 2 right TKR-cms wnl, cullen diet, vs/labs wnl, cleared PT/OT, afebrile-no s/s infectio-see integ assessment, norco/gel wrap for pain control, asa bid/scds, plan home with [facility]-refer to dc record  Problem: Patient Centered Care  Goal: Patient preferences are identified and integrated in the patient's plan of care  Description: Interventions:  - What would you like us to know as we care for you?  I am a retired nurse  - Provide timely, complete, and accurate information to patient/family  - Incorporate patient and family knowledge, values, beliefs, and cultural backgrounds into the planning and delivery of care  - Encourage patient/family to participate in care and decision-making at the level they choose  - Honor patient and family perspectives and choices  Outcome: Adequate for Discharge     Problem: Patient/Family Goals  Goal: Patient/Family Long Term Goal  Description: Patient's Long Term Goal: return to baseline adls    Interventions:  - pt/ot  Sw dc planning  Pain control  - See additional Care Plan goals for specific interventions  Outcome: Adequate for Discharge  Goal: Patient/Family Short Term Goal  Description: Patient's Short Term Goal: home today    Interventions:   - vs/labs wnl  Cleared therapy services  Pain well controlled  [place] diet  - See additional Care Plan goals for specific interventions  Outcome: Adequate for Discharge     Problem: PAIN - ADULT  Goal: Verbalizes/displays adequate comfort level or patient's stated pain goal  Description: INTERVENTIONS:  - Encourage pt to monitor pain and request assistance  - Assess pain using appropriate pain scale  - Administer analgesics based on type and severity of pain and evaluate response  - Implement non-pharmacological measures as appropriate and evaluate response  - Consider cultural and social influences on pain and pain management  - Manage/alleviate anxiety  - Utilize distraction and/or relaxation techniques  - Monitor for opioid side effects  - Notify MD/LIP if interventions unsuccessful or patient reports new pain  - Anticipate increased pain with activity and pre-medicate as appropriate  Outcome: Adequate for Discharge     Problem: RISK FOR INFECTION - ADULT  Goal: Absence of fever/infection during anticipated neutropenic period  Description: INTERVENTIONS  - Monitor WBC  - Administer growth factors as ordered  - Implement neutropenic guidelines  Outcome: Adequate for Discharge     Problem: SAFETY ADULT - FALL  Goal: Free from fall injury  Description: INTERVENTIONS:  - Assess pt frequently for physical needs  - Identify cognitive and physical deficits and behaviors that affect risk of falls.   - [place] fall precautions as indicated by assessment.  - Educate pt/family on patient safety including physical limitations  - Instruct pt to call for assistance with activity based on assessment  - Modify environment to reduce risk of injury  - Provide assistive devices as appropriate  - Consider OT/PT consult to assist with strengthening/mobility  - Encourage toileting schedule  Outcome: Adequate for Discharge     Problem: DISCHARGE PLANNING  Goal: Discharge to home or other facility with appropriate resources  Description: INTERVENTIONS:  - Identify barriers to discharge w/pt and caregiver  - Include patient/family/discharge partner in discharge planning  - Arrange for needed discharge resources and transportation as appropriate  - Identify discharge learning needs (meds, wound care, etc)  - Arrange for interpreters to assist at discharge as needed  - Consider post-discharge preferences of patient/family/discharge partner  - Complete POLST form as appropriate  - Assess patient's ability to be responsible for managing their own health  - Refer to Case Management Department for coordinating discharge planning if the patient needs post-hospital services based on physician/LIP order or complex needs related to functional status, cognitive ability or social support system  Outcome: Adequate for Discharge

## 2023-08-27 NOTE — PHYSICAL THERAPY NOTE
PHYSICAL THERAPY TREATMENT NOTE - INPATIENT     Room Number: 407/407-A       Presenting Problem: s/p R TKA    Problem List  Principal Problem:    Primary osteoarthritis of right knee  Active Problems:    Essential hypertension    Preop testing    Asthma      PHYSICAL THERAPY ASSESSMENT   Chart reviewed. RN Tosha Villanueva approved participation in physical therapy. PPE worn by therapist: mask and gloves. Patient was not wearing a mask during session. Patient presented in bedside chair with 5/10 pain. Patient with good  progress towards goals during this session. Education provided on Total knee exercise protocol, Physical therapy plan of care, and physiological benefits of out of bed mobility. Patient with good carryover. Pt is received in the chair and was cleared for therapy session. Pt is min a with sit<>stand transfers with the RW. Pt denied any dizziness and light headedness. Pt was cued for proper hand placement. Pt was able to AMB about 40' with the RW SBA. Pt with very slow gait and decreased step length with narrow GRISELDA but with good balance and safety awareness. Pt then reported increased dizziness and then returned pt back to the bed. Pt was able to reposition herself with cueing. Pt's BP taken and was 150/80. Pt also reported that she has no stairs and elevator access and will have assist at home. Pt reported that she would be okay at home and was ready to dc today. Pt is left in the bed with all needs within reach. Pt is on rack to dc to home once medically cleared. Reported to the RN on the status of the pt. Bed mobility: Supervision  Transfers: Min assist  Gait Assistance: Supervision  Distance (ft): 40'  Assistive Device: Rolling walker  Pattern: R Decreased stance time (narrow GRISELDA)          . Patient was left in bed at end of session with all needs in reach.  The patient's Approx Degree of Impairment: 46.58% has been calculated based on documentation in the [facility] '6 clicks' Inpatient Basic Mobility Short Form.  Research supports that patients with this level of impairment may benefit from Home with home health PT. RN aware of patient status post session. DISCHARGE RECOMMENDATIONS  PT Discharge Recommendations: Home with home health PT;24 hour care/supervision     PLAN  PT Treatment Plan: Bed mobility; Body mechanics; Coordination; Endurance; Patient education;Gait training;Range of motion;Strengthening;Transfer training;Balance training    SUBJECTIVE  Pt was agreeable to therapy session. OBJECTIVE       WEIGHT BEARING RESTRICTION  Weight Bearing Restriction: R lower extremity        R Lower Extremity: Weight Bearing as Tolerated       PAIN ASSESSMENT   Ratin  Location: R knee  Management Techniques: Activity promotion; Body mechanics; Relaxation;Repositioning    BALANCE                                                                                                                       Static Sitting: Good  Dynamic Sitting: Fair +           Static Standing: Fair  Dynamic Standing: Fair -    ACTIVITY TOLERANCE                         O2 WALK       AM-PAC '6-Clicks' INPATIENT SHORT FORM - BASIC MOBILITY  How much difficulty does the patient currently have. .. Patient Difficulty: Turning over in bed (including adjusting bedclothes, sheets and blankets)?: A Little   Patient Difficulty: Sitting down on and standing up from a chair with arms (e.g., wheelchair, bedside commode, etc.): A Little   Patient Difficulty: Moving from lying on back to sitting on the side of the bed?: A Little   How much help from another person does the patient currently need. ..    Help from Another: Moving to and from a bed to a chair (including a wheelchair)?: A Little   Help from Another: Need to walk in hospital room?: A Little   Help from Another: Climbing 3-5 steps with a railing?: A Little     AM-PAC Score:  Raw Score: 18   Approx Degree of Impairment: 46.58%   Standardized Score (AM-PAC Scale): 43.63   CMS Modifier (G-Code): CK          Patient End of Session: Up in chair;Needs met;Call light within reach;RN aware of session/findings; All patient questions and concerns addressed;SCDs in place    CURRENT GOALS   Goals to be met by: 9/9/23  Patient Goal Patient's self-stated goal is: to go home   Goal #1 Patient is able to demonstrate supine - sit EOB @ level: modified independent     Goal #1   Current Status SBA back to supine   Goal #2 Patient is able to demonstrate transfers Sit to/from Stand at assistance level: modified independent     Goal #2  Current Status Min A with the RW from the chair   Goal #3 Patient is able to ambulate 300 feet with assistive device at assistance level: modified independent    Goal #3   Current Status 36' with the RW SBA   Goal #4 Patient will negotiate one curb w/ assistive device and supervision   Goal #4   Current Status NT pt reported that she has no steps to negotiate and has elevator access. Goal #5  AROM 0 degrees extension to 95 degrees flexion     Goal #5   Current Status IN PROGRESS   Goal #6 Patient independently performs home exercise program for ROM/strengthening per the instructions provided in preparation for discharge.    Goal #6  Current Status IN PROGRESS           Gait Training: 10 minutes  Therapeutic Activity: 15 minutes

## 2023-08-27 NOTE — CM/SW NOTE
08/27/23 0900   CM/SW Referral Data   Referral Source Physician;Social Work (self-referral)   Reason for Referral Discharge planning   Informant Patient   Medical Hx   Does patient have an established PCP? Yes   Patient Info   Patient's Current Mental Status at Time of Assessment Alert;Oriented   Patient's Home Environment Condo/Apt with elevator   Number of Levels in Home 1   Number of Stair in Home 0   Patient lives with Son   Patient Status Prior to Admission   Independent with ADLs and Mobility Yes   Discharge Needs   Anticipated D/C needs Home health care   Choice of Post-Acute Provider   Informed patient of right to choose their preferred provider Yes   List of appropriate post-acute services provided to patient/family with quality data Yes   [facility]family choice [facility]     SW spoke with pt to finalize [facility]. Referrals were sent. Orders/F2F entered pt states she has hx with [facility] and would like to use them again    SW reserved [facility] in aidin and notified [facility] rep    PLAN: Home with [facility] , LSW, MSW ext.  [phone]

## 2023-11-07 ENCOUNTER — OFFICE VISIT (OUTPATIENT)
Dept: HEMATOLOGY/ONCOLOGY | Facility: HOSPITAL | Age: 78
End: 2023-11-07
Attending: INTERNAL MEDICINE
Payer: MEDICARE

## 2023-11-07 ENCOUNTER — LAB ENCOUNTER (OUTPATIENT)
Dept: LAB | Facility: HOSPITAL | Age: 78
End: 2023-11-07
Attending: INTERNAL MEDICINE
Payer: MEDICARE

## 2023-11-07 VITALS
WEIGHT: 226.19 LBS | HEIGHT: 67 IN | OXYGEN SATURATION: 100 % | DIASTOLIC BLOOD PRESSURE: 85 MMHG | RESPIRATION RATE: 18 BRPM | TEMPERATURE: 98 F | BODY MASS INDEX: 35.5 KG/M2 | HEART RATE: 108 BPM | SYSTOLIC BLOOD PRESSURE: 140 MMHG

## 2023-11-07 DIAGNOSIS — Z90.13 S/P MASTECTOMY, BILATERAL: ICD-10-CM

## 2023-11-07 DIAGNOSIS — Z78.0 ASYMPTOMATIC MENOPAUSAL STATE: ICD-10-CM

## 2023-11-07 DIAGNOSIS — D50.0 BLOOD LOSS ANEMIA: ICD-10-CM

## 2023-11-07 DIAGNOSIS — Z13.820 ENCOUNTER FOR SCREENING FOR OSTEOPOROSIS: ICD-10-CM

## 2023-11-07 DIAGNOSIS — Z17.1 MALIGNANT NEOPLASM OF RIGHT BREAST, STAGE 1, ESTROGEN RECEPTOR NEGATIVE: Primary | ICD-10-CM

## 2023-11-07 DIAGNOSIS — C50.911 MALIGNANT NEOPLASM OF RIGHT BREAST, STAGE 1, ESTROGEN RECEPTOR NEGATIVE: Primary | ICD-10-CM

## 2023-11-07 LAB
DEPRECATED HBV CORE AB SER IA-ACNC: 61.3 NG/ML
DEPRECATED RDW RBC AUTO: 41.5 FL (ref 35.1–46.3)
ERYTHROCYTE [DISTWIDTH] IN BLOOD BY AUTOMATED COUNT: 12.2 % (ref 11–15)
FOLATE SERPL-MCNC: >24 NG/ML (ref 5.4–?)
HCT VFR BLD AUTO: 39.8 %
HGB BLD-MCNC: 13.4 G/DL
IRON SATN MFR SERPL: 34 %
IRON SERPL-MCNC: 97 UG/DL
MCH RBC QN AUTO: 31.2 PG (ref 26–34)
MCHC RBC AUTO-ENTMCNC: 33.7 G/DL (ref 31–37)
MCV RBC AUTO: 92.8 FL
PLATELET # BLD AUTO: 320 10(3)UL (ref 150–450)
RBC # BLD AUTO: 4.29 X10(6)UL
TIBC SERPL-MCNC: 283 UG/DL (ref 250–425)
TRANSFERRIN SERPL-MCNC: 190 MG/DL (ref 250–380)
VIT B12 SERPL-MCNC: 599 PG/ML (ref 211–911)
WBC # BLD AUTO: 6.9 X10(3) UL (ref 4–11)

## 2023-11-07 PROCEDURE — 82746 ASSAY OF FOLIC ACID SERUM: CPT

## 2023-11-07 PROCEDURE — 82607 VITAMIN B-12: CPT

## 2023-11-07 PROCEDURE — 99214 OFFICE O/P EST MOD 30 MIN: CPT | Performed by: INTERNAL MEDICINE

## 2023-11-07 PROCEDURE — 84466 ASSAY OF TRANSFERRIN: CPT

## 2023-11-07 PROCEDURE — 83540 ASSAY OF IRON: CPT

## 2023-11-07 PROCEDURE — 82728 ASSAY OF FERRITIN: CPT

## 2023-11-07 PROCEDURE — 36415 COLL VENOUS BLD VENIPUNCTURE: CPT

## 2023-11-07 PROCEDURE — 85027 COMPLETE CBC AUTOMATED: CPT

## 2024-02-14 NOTE — H&P
[facility]    History and Physical    Juan Catherine Patient Status:  Outpatient in a Bed    3/11/1945 MRN [id number]   Location [place] [address] Attending Larissa Layton MD   Hosp Day # 0 PCP Susan Galindo MD     Date:  2023      History provided by: Patient  HPI:   No chief complaint on file. HPI    Ashish Pierson is a pleasant 57-year-old female following up in office today for 1 year postop visit for left total knee arthroplasty. She is very pleased with surgery. She is doing well. She just returned from a trip to [facility] with no complaint to the left knee. She is scheduled for right total knee arthroplasty on 2023. Her last cortisone injection to the right knee was in 2023. She ambulates independently without assistive device. She denies tingling or numbness in bilateral lower extremities. For the right knee she has already had [facility]. She has upcoming appointments for both medical and dental clearances later this month. She previously filled out the surgical form with Dr. Karson Juarez over the phone. She denies any changes in her medical history since her previous visit. She is not diabetic. She is not on blood thinners.   History     Past Medical History:   Diagnosis Date    Achilles rupture, right 2016    Asthma     Asymptomatic menopause 2020    Breast cancer ([address] 2013:  RIGHT BREAST MASTECTOMY WITH SENTINEL LYMPH NODE BIOPSY AND LEFT BREAST MASTECTOMY    Cataract     Epigastric abdominal pain 10/15/2020    Family history of cancer 2016    Herpes zoster infection of thoracic region 2019    High blood pressure     Hx of motion sickness     Malignant neoplasm of right breast, stage 1, estrogen receptor negative  2013    Mild intermittent asthma 2017    Mixed hyperlipidemia 2018    Osteoarthritis     Port-A-Cath in place 2014    PUD (peptic ulcer disease)     /esophagitis / EGD with biopsy    S/P colonoscopy 2009    Visual impairment     glasses     Past Surgical History:   Procedure Laterality Date    CATARACT      COLONOSCOPY  12/2014    [place]: diverticulosis, no polyps    COLONOSCOPY  11/2009    polyp    COLONOSCOPY  11/2020    EGD  08/2012    small antral ulcers    EGD  11/2020    EGD N/A 11/23/2020    Procedure: ESOPHAGOGASTRODUODENOSCOPY, COLONOSCOPY, POSSIBLE BIOPSY, POSSIBLE POLYPECTOMY 53476, 40157;  Surgeon: Han Mitchell MD;  Location: [address] [place] [address]; W/ IMAGING GUIDANCE Right 11/19/2013    ULTRASOUND GUIDED FNA AND CORE BIOPSY RIGHT BREAST:    FRACTURE SURGERY Left 2008    knee cap    HYSTERECTOMY      MASTECTOMY LEFT Left 12/17/2013    RIGHT BREAST MASTECTOMY WITH SENTINEL LYMPH NODE BIOPSY AND LEFT BREAST MASTECTOMY    MASTECTOMY RIGHT Bilateral 12/17/2013    RIGHT BREAST MASTECTOMY WITH SENTINEL LYMPH NODE BIOPSY AND LEFT BREAST MASTECTOMY    REPAIR ACHILLES TENDON,PRIMARY Right      Family History   Problem Relation Age of Onset    Ovarian Cancer Paternal Cousin Female         ovarian / age 72    Breast Cancer Mother 55    Cancer Father         bladder / 80years old    Breast Cancer Sister 79        s/p lumpectomy / tamoifen; Jose Brownlee (2012)    Cancer Daughter 39        metastatic adenocarcinoma of the colon / 45     Breast Cancer Niece 55        BRCA1/2 neg (2013)    Cancer Paternal Grandmother         possible stomach ca? Breast Cancer Niece 48     Social History:  Social History     Socioeconomic History    Marital status:     Tobacco Use    Smoking status: Former     Packs/day: 1.00     Years: 10.00     Pack years: 10.00     Types: Cigarettes    Smokeless tobacco: Never    Tobacco comments:     quit smoking ~ 1972   Vaping Use    Vaping Use: Never used   Substance and Sexual Activity    Alcohol use: Yes     Comment: occcationally     Drug use: No   Other Topics Concern    Caffeine Concern No     Allergies/Medications: Allergies:   Penicillins             RESPIRATORY FAILURE  Pistachios              RESPIRATORY FAILURE  Dander                  OTHER (SEE COMMENTS)    Comment:Difficulty breathing  No medications prior to admission. Review of Systems:   Review of Systems  No chest pain, shortness of breath, fevers, chills. Physical Exam:   Vital Signs:  Height 5' 7\" (1.702 m), weight 230 lb (104.3 kg). Physical Exam  On exam the left knee had no asymmetric warmth, ecchymosis, or effusion. Surgical incision was healed cosmetically. Bilateral calves are soft and nontender. Skin of bilateral legs were healthy without pitting edema. Left knee range of motion was 0 to 130 degrees. Patella was tracking well. No instability. She ambulates independently. For the right knee not characteristic warmth, ecchymosis, or effusion. Despite arthritis she has good motion from 0 to 130 degrees. Varus and valgus stressing did not reproduce her pain. She was tender palpation on the medial joint line. Lateral joint line was nontender. Sensorimotor intact in bilateral lower extremities. Results:     Lab Results   Component Value Date    WBC 7.2 08/11/2023    HGB 12.6 08/11/2023    HCT 37.9 08/11/2023    .0 08/11/2023    CREATSERUM 0.96 08/07/2023    BUN 13 08/07/2023     (L) 08/07/2023    K 4.0 08/07/2023     08/07/2023    CO2 28.0 08/07/2023     (H) 08/07/2023    CA 9.3 08/07/2023    ALB 4.1 08/07/2023    ALKPHO 87 08/07/2023    BILT 0.7 08/07/2023    TP 8.1 08/07/2023    AST 15 08/07/2023    ALT 22 08/07/2023    PTT 29.4 08/07/2023    INR 1.00 08/07/2023    T4F 1.2 09/22/2020    TSH 2.800 09/18/2021    DDIMER 0.62 03/03/2023    MG 2.5 09/30/2021    TROP <0.045 09/30/2021    TROPHS 5 03/03/2023     No results found. X-rays taken in office today of bilateral knees show left total knee arthroplasty well fixed and in proper alignment without loosening.   She has a chronic finding of a small metallic opal in the mid substance of the patella and this remains unchanged. On the frontal view she has bone-on-bone osteoarthritic changes to the medial joint line of the right knee as well as joint space narrowing to the patellofemoral joint. Assessment/Plan:   1) status post left total knee arthroplasty, 1 year out, doing very well  2) right knee osteoarthritis, upcoming knee replacement planned for 8/25/2023. For the right knee she is scheduled for surgery on 8/25/2023. She is going to obtain medical and dental clearance as she has just entered the 30-day preoperative window. She has already completed the MRI MRI. She previously filled out surgical form with Dr. Lilo Marmolejo and discussed the risk of surgery at that time. She had no further questions in office today and wished to proceed.             Zhanna Mendoza PA-C  8/23/2023 No

## 2024-05-07 ENCOUNTER — HOSPITAL ENCOUNTER (OUTPATIENT)
Dept: BONE DENSITY | Facility: HOSPITAL | Age: 79
Discharge: HOME | End: 2024-05-07
Attending: INTERNAL MEDICINE
Payer: MEDICARE

## 2024-05-07 DIAGNOSIS — Z13.820 ENCOUNTER FOR SCREENING FOR OSTEOPOROSIS: ICD-10-CM

## 2024-05-07 DIAGNOSIS — Z78.0 ASYMPTOMATIC MENOPAUSAL STATE: ICD-10-CM

## 2024-05-07 PROCEDURE — 77080 DXA BONE DENSITY AXIAL: CPT | Performed by: INTERNAL MEDICINE

## 2024-05-09 ENCOUNTER — TELEPHONE (OUTPATIENT)
Dept: HEMATOLOGY/ONCOLOGY | Facility: HOSPITAL | Age: 79
End: 2024-05-09

## 2024-05-09 NOTE — TELEPHONE ENCOUNTER
Called patient per Dr. Negrete to let the patient know her bone density has decreased and back to osteopenic range with high fracture risk. She should follow up with her PCP to discuss resuming alendronate. Patient stated understanding.

## 2024-06-10 NOTE — PROGRESS NOTES
Amlodipine/Benazepril 10/20 has been therapeutically substituted with Amlodipine/Lisinopril 10/20.     Olivier Raya, [address] [place] [address]  [phone] severe

## (undated) DEVICE — CEMENT MIXING SYSTEM WITH FEMORAL BREAKWAY NOZZLE: Brand: REVOLUTION

## (undated) DEVICE — VIOLET BRAIDED (POLYGLACTIN 910), SYNTHETIC ABSORBABLE SUTURE: Brand: COATED VICRYL

## (undated) DEVICE — SYRINGE 35ML LL TIP

## (undated) DEVICE — APPLICATOR CHLORAPREP 26ML

## (undated) DEVICE — SPINOCAN® 18 GA. X 3-1/2 IN. (90 MM) SPINAL NEEDLE: Brand: SPINOCAN®

## (undated) DEVICE — WRAP COOLING KNEE W/ICE PILLOW

## (undated) DEVICE — PIN DRL 3.2MM 2.5MM 75MM STRL

## (undated) DEVICE — Device: Brand: STABLECUT®

## (undated) DEVICE — GAMMEX® PI HYBRID SIZE 6.5, STERILE POWDER-FREE SURGICAL GLOVE, POLYISOPRENE AND NEOPRENE BLEND: Brand: GAMMEX

## (undated) DEVICE — 48MM HEADED SCREW-Z

## (undated) DEVICE — DRAPE SHEET LARGE 76X55

## (undated) DEVICE — GAMMEX® NON-LATEX PI ORTHO SIZE 7, STERILE POLYISOPRENE POWDER-FREE SURGICAL GLOVE: Brand: GAMMEX

## (undated) DEVICE — 2MM SET SCREW HEX DRIVER

## (undated) DEVICE — HOOD: Brand: FLYTE

## (undated) DEVICE — GAMMEX® NON-LATEX PI ORTHO SIZE 8, STERILE POLYISOPRENE POWDER-FREE SURGICAL GLOVE: Brand: GAMMEX

## (undated) DEVICE — DERMABOND CLOSURE 0.7ML TOPICL

## (undated) DEVICE — SUT MONOCRYL 3-0 PS-1 Y936H

## (undated) DEVICE — DRESSING 10X4IN ANMC SAFETAC

## (undated) DEVICE — SOLUTION  .9 1000ML BTL

## (undated) DEVICE — SUT VICRYL 2-0 FS-1 J443H

## (undated) DEVICE — TROCAR-Z

## (undated) DEVICE — SCREW BNE PERS L27MM OD3.5MM

## (undated) DEVICE — GAUZE SPONGES,12 PLY: Brand: CURITY

## (undated) DEVICE — SOL NACL IRRIG 0.9% 1000ML BTL

## (undated) DEVICE — PERSONA CM FM/CM TB/VE: Type: IMPLANTABLE DEVICE

## (undated) DEVICE — DISPOSABLE TOURNIQUET CUFF SINGLE BLADDER, DUAL PORT AND QUICK CONNECT CONNECTOR: Brand: COLOR CUFF

## (undated) DEVICE — PSI PERSONA PS PIN GDE FEM-TIB

## (undated) DEVICE — GAUZE TRAY STERILE 4X4 12PLY

## (undated) DEVICE — TOTAL KNEE: Brand: MEDLINE INDUSTRIES, INC.

## (undated) DEVICE — 3M™ STERI-DRAPE™ U-DRAPE 1015: Brand: STERI-DRAPE™

## (undated) DEVICE — GAMMEX® PI HYBRID SIZE 7.5, STERILE POWDER-FREE SURGICAL GLOVE, POLYISOPRENE AND NEOPRENE BLEND: Brand: GAMMEX

## (undated) DEVICE — BNDG COHESIVE W4INXL5YD TAN E

## (undated) DEVICE — PROXIMATE SKIN STAPLERS (35 WIDE) CONTAINS 35 STAINLESS STEEL STAPLES (FIXED HEAD): Brand: PROXIMATE

## (undated) DEVICE — CLOSURE EXOFIN 1.0ML

## (undated) DEVICE — SCRW BONE PERSONA 3.5 MMXL48MM

## (undated) DEVICE — SOLUTION  .9 3000ML

## (undated) DEVICE — 25MM FEMALE SCREW-Z

## (undated) DEVICE — SCREW HEADFRAME FEM HEX 2.5MM

## (undated) DEVICE — BANDAGE COMP PREMPRO 5YDX4IN

## (undated) NOTE — MR AVS SNAPSHOT
Dominguez Roger   2017 12:30 PM   Office Visit   MRN:  [id number]    Description:  Female : 3/11/1945   Provider:  Deanna Mejia   Department:  [facility]ematology Oncology              Visit Summary      Allergies     Pistachio Follow-up Instructions     Return in about 6 months (around 10/13/2017). To Do List     Thursday June 01, 2017 1:40 PM     Appointment with Mary Reid at [facility], [address]  ([phone])   [address]

## (undated) NOTE — LETTER
Hospital Discharge Documentation  Please phone to schedule a hospital follow up appointment.     From: [address] Hospitalist's Office  Phone: [phone]    Patient discharged time/date: 4/29/2018 12:01 PM  Patient discharge disposition:  Home or Self grandchildren visit frequently. She has been flying to [place] and [place] on multiple occasions to help care for family members, one of whom is 80. The other is 80. She also has been working on weight loss and has started a diet with [employer]. increase protein in her diet. Will have repeat BMP done on Wed and will CC Ishaan Goyal and Katarina Raygoza for results. Possibly side effect of her weight loss drops ? 2. History of breast cancer, with headache and hyponatremia.   While CT scan did not reveal microangiopathy with mild generalized cerebral volume loss. 3. Small chronic lacunar infarcts at the periphery of the left cerebellum. 4. Lesser incidental findings as above.      Dictated by (CST): Ashanti Beach MD on 4/28/2018 at 14:08     Approved by ( Take  by mouth. Refills:  0     EPINEPHrine 0.3 MG/0.3ML Soaj  Commonly known as:  EPIPEN 2-CRISTIAN      Inject IM in event of  allergic reaction   Quantity:  1 each  Refills:  0     KP FISH OIL 1200 MG Caps      Take  by mouth.    Refills:  0     THERA/BETA

## (undated) NOTE — MR AVS SNAPSHOT
[address]  [phone]               Thank you for choosing us for your health care visit with Amie López DPM.  We are glad to serve you and happy to provide yo Commonly known as:  EPIPEN 2-CIRSTIAN           KP FISH OIL 1200 MG Caps   Take  by mouth. MULTI-VITAMINS Tabs   Take  by mouth.            PROAIR  (90 Base) MCG/ACT Aers   Generic drug:  Albuterol Sulfate HFA   Inhale 2 puffs into the lungs jessi

## (undated) NOTE — MR AVS SNAPSHOT
[address] at [facility]  [address]  [facility], [address]               Thank you for choosing us for your health care visit with Ofelia Leary MD.  We are glad to serve you and happy to provide EPINEPHrine 0.3 MG/0.3ML Soaj   Inject IM in event of  allergic reaction   Commonly known as:  EPIPEN 2-CRISTIAN           KP FISH OIL 1200 MG Caps   Take  by mouth.           methylPREDNISolone 4 MG Tbpk   As directed.    Commonly known as:  MEDROL           M Tips for making healthy food choices  -   Enjoy your food, but eat less. Fully enjoy your food when eating. Don’t eat while distracted and slow down. Avoid over sized portions. Don’t eat while when you’re bored.      EAT THESE FOODS MORE OFTEN: EAT T